# Patient Record
Sex: FEMALE | Race: WHITE
[De-identification: names, ages, dates, MRNs, and addresses within clinical notes are randomized per-mention and may not be internally consistent; named-entity substitution may affect disease eponyms.]

---

## 2017-12-13 ENCOUNTER — APPOINTMENT (OUTPATIENT)
Dept: OBGYN | Facility: CLINIC | Age: 57
End: 2017-12-13
Payer: COMMERCIAL

## 2017-12-13 VITALS
DIASTOLIC BLOOD PRESSURE: 79 MMHG | WEIGHT: 135 LBS | BODY MASS INDEX: 22.49 KG/M2 | HEART RATE: 74 BPM | SYSTOLIC BLOOD PRESSURE: 122 MMHG | HEIGHT: 65 IN

## 2017-12-13 PROCEDURE — 99396 PREV VISIT EST AGE 40-64: CPT

## 2017-12-14 LAB
C TRACH RRNA SPEC QL NAA+PROBE: NOT DETECTED
HPV HIGH+LOW RISK DNA PNL CVX: NOT DETECTED
N GONORRHOEA RRNA SPEC QL NAA+PROBE: NOT DETECTED
SOURCE TP AMPLIFICATION: NORMAL

## 2017-12-18 LAB — CYTOLOGY CVX/VAG DOC THIN PREP: NORMAL

## 2018-11-18 ENCOUNTER — EMERGENCY (EMERGENCY)
Facility: HOSPITAL | Age: 58
LOS: 0 days | Discharge: HOME | End: 2018-11-18
Attending: EMERGENCY MEDICINE | Admitting: EMERGENCY MEDICINE
Payer: COMMERCIAL

## 2018-11-18 VITALS
OXYGEN SATURATION: 97 % | DIASTOLIC BLOOD PRESSURE: 76 MMHG | HEART RATE: 80 BPM | TEMPERATURE: 98 F | RESPIRATION RATE: 18 BRPM | SYSTOLIC BLOOD PRESSURE: 107 MMHG

## 2018-11-18 VITALS — HEIGHT: 65 IN | WEIGHT: 134.04 LBS

## 2018-11-18 DIAGNOSIS — S01.411A LACERATION WITHOUT FOREIGN BODY OF RIGHT CHEEK AND TEMPOROMANDIBULAR AREA, INITIAL ENCOUNTER: ICD-10-CM
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
## 2018-11-18 PROCEDURE — 99283 EMERGENCY DEPT VISIT LOW MDM: CPT

## 2018-11-18 NOTE — ED ADULT NURSE NOTE - NSIMPLEMENTINTERV_GEN_ALL_ED
Implemented All Universal Safety Interventions:  [place] to call system. Call bell, personal items and telephone within reach. Instruct patient to call for assistance. Room bathroom lighting operational. Non-slip footwear when patient is off stretcher. Physically safe environment: no spills, clutter or unnecessary equipment. Stretcher in lowest position, wheels locked, appropriate side rails in place.

## 2018-11-18 NOTE — ED STATDOCS - SKIN, MLM
skin normal color for race, warm, dry and intact. Linear laceration approximately 2cm to right cheek. No active bleeding.

## 2018-11-18 NOTE — ED STATDOCS - ATTENDING CONTRIBUTION TO CARE
I, Ignacio Hinojosa MD,  performed the initial face to face bedside interview with this patient regarding history of present illness, review of symptoms and relevant past medical, social and family history.  I completed an independent physical examination.  I was the initial provider who evaluated this patient. I have signed out the follow up of any pending tests (i.e. labs, radiological studies) to the ACP.  I have communicated the patient’s plan of care and disposition with the ACP.

## 2018-11-18 NOTE — ED STATDOCS - OBJECTIVE STATEMENT
58 y/o female with no significant PMHx  presents to the ED c/o laceration. Pt was raking leaves, fell, and cut face on a branch. Pt here to see Dr. Veronica. Tetanus UTD. Allergies: Penicillin.

## 2018-11-18 NOTE — ED STATDOCS - CARE PROVIDER_API CALL
Henrik Hill), Plastic Surgery  [address] [facility]  [address] [place] [address]  Phone: [phone]  Fax: [phone]

## 2018-11-18 NOTE — ED STATDOCS - PROGRESS NOTE DETAILS
58 y/o F with no PMH presents with laceration to right cheek today. pt went to outside urgent care, had tetanus updated there. Here to see Dr. Veronica for repair. injury caused by tree branch. Not on anticoagulants. PE: 2cm vertical laceration to right cheek with minimal active bleeding. Surrounding tissue is not erythematous, warm to touch. No other signs of trauma. A/P: laceration. Dr. Veronica here for repair. - Nicola Mccabe PA-C

## 2018-11-18 NOTE — ED STATDOCS - NS_ ATTENDINGSCRIBEDETAILS _ED_A_ED_FT
I, Ignacio Hinojosa MD,  performed the initial face to face bedside interview with this patient regarding history of present illness, review of symptoms and relevant past medical, social and family history.  I completed an independent physical examination.    The history, relevant review of systems, past medical and surgical history, medical decision making, and physical examination was documented by the scribe in my presence and I attest to the accuracy of the documentation.

## 2018-11-23 ENCOUNTER — EMERGENCY (EMERGENCY)
Facility: HOSPITAL | Age: 58
LOS: 0 days | Discharge: HOME | End: 2018-11-23
Attending: EMERGENCY MEDICINE | Admitting: EMERGENCY MEDICINE

## 2018-11-23 VITALS
DIASTOLIC BLOOD PRESSURE: 94 MMHG | TEMPERATURE: 98 F | RESPIRATION RATE: 16 BRPM | HEART RATE: 89 BPM | OXYGEN SATURATION: 98 % | SYSTOLIC BLOOD PRESSURE: 144 MMHG

## 2018-11-23 VITALS — WEIGHT: 125 LBS | HEIGHT: 65 IN

## 2018-11-23 DIAGNOSIS — S01.81XD LACERATION WITHOUT FOREIGN BODY OF OTHER PART OF HEAD, SUBSEQUENT ENCOUNTER: ICD-10-CM
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
## 2018-11-23 NOTE — ED STATDOCS - PROGRESS NOTE DETAILS
CINTHYA Keys:   Patient has been seen, evaluated and orders have been written by the attending in intake. Patient is stable.  I will follow up the results of orders written and I will continue to evaluate/observe the patient.   Janell Keys PA-C Dr. perera finished suture removal.  Will dc home.  Janell Keys PA-C

## 2018-11-23 NOTE — ED STATDOCS - OBJECTIVE STATEMENT
58 y/o female with no PMHx presents to the ED requesting suture removal by Dr. Veronica. Pt states she was raking leaves on 11/18/18 when she fell and cut the right side of her face on a branch. Pt received sutures at that time which are ready to be removed. No pain or complaints at this time.
no chest pain, no cough, and no shortness of breath.

## 2018-11-23 NOTE — ED ADULT NURSE NOTE - NSIMPLEMENTINTERV_GEN_ALL_ED
Implemented All Universal Safety Interventions:  [place] to call system. Call bell, personal items and telephone within reach. Instruct patient to call for assistance. Room bathroom lighting operational. Non-slip footwear when patient is off stretcher. Physically safe environment: no spills, clutter or unnecessary equipment. Stretcher in lowest position, wheels locked, appropriate side rails in place.

## 2018-12-26 ENCOUNTER — APPOINTMENT (OUTPATIENT)
Dept: OBGYN | Facility: CLINIC | Age: 58
End: 2018-12-26

## 2019-01-25 ENCOUNTER — APPOINTMENT (OUTPATIENT)
Dept: DERMATOLOGY | Facility: CLINIC | Age: 59
End: 2019-01-25
Payer: COMMERCIAL

## 2019-01-25 PROCEDURE — 99202 OFFICE O/P NEW SF 15 MIN: CPT

## 2019-03-13 ENCOUNTER — APPOINTMENT (OUTPATIENT)
Dept: OBGYN | Facility: CLINIC | Age: 59
End: 2019-03-13
Payer: COMMERCIAL

## 2019-03-13 VITALS
BODY MASS INDEX: 23.66 KG/M2 | DIASTOLIC BLOOD PRESSURE: 87 MMHG | HEIGHT: 65 IN | WEIGHT: 142 LBS | SYSTOLIC BLOOD PRESSURE: 127 MMHG | HEART RATE: 68 BPM

## 2019-03-13 PROCEDURE — 99396 PREV VISIT EST AGE 40-64: CPT

## 2019-03-13 NOTE — HISTORY OF PRESENT ILLNESS
[Hot Flashes] : hot flashes [Night Sweats] : night sweats [Vaginal Itching] : no vaginal itching [Dyspareunia] : no dyspareunia [Mood Changes] : no mood changes [2/10] : described as 2/10 in severity [Headache] : no headache [Fatigue] : no fatigue [Weight Change] : no weight change [Irritability] : no irritability [Forgetfulness] : no forgetfulness [Loss of Libido] : no loss of libido [Depression] : no depression [Anxiety] : no anxiety [Hot Environment] : exacerbated by hot environment [Alcohol Intake] : not exacerbated by alcohol intake [Sexual Arousal] : not exacerbated by sexual arousal [Stress] : not exacerbated by stress [Cool Environment] : relieved by cool environment [Exercise] : not relieved by exercise [Relaxation] : relieved by relaxation [Hormonal Therapy] : not relieved by hormonal therapy [Sleep Medications] : not relieved by sleep medications [Anticholinergics] : not relieved by anticholinergics

## 2019-03-19 LAB — CYTOLOGY CVX/VAG DOC THIN PREP: NORMAL

## 2020-03-18 ENCOUNTER — APPOINTMENT (OUTPATIENT)
Dept: OBGYN | Facility: CLINIC | Age: 60
End: 2020-03-18

## 2020-10-06 ENCOUNTER — APPOINTMENT (OUTPATIENT)
Dept: OBGYN | Facility: CLINIC | Age: 60
End: 2020-10-06
Payer: COMMERCIAL

## 2020-10-06 VITALS
WEIGHT: 125.25 LBS | SYSTOLIC BLOOD PRESSURE: 134 MMHG | HEART RATE: 116 BPM | DIASTOLIC BLOOD PRESSURE: 89 MMHG | HEIGHT: 65 IN | BODY MASS INDEX: 20.87 KG/M2

## 2020-10-06 DIAGNOSIS — Z12.39 ENCOUNTER FOR OTHER SCREENING FOR MALIGNANT NEOPLASM OF BREAST: ICD-10-CM

## 2020-10-06 DIAGNOSIS — R23.2 FLUSHING: ICD-10-CM

## 2020-10-06 PROCEDURE — 99214 OFFICE O/P EST MOD 30 MIN: CPT | Mod: 25

## 2020-10-06 PROCEDURE — 99396 PREV VISIT EST AGE 40-64: CPT

## 2020-10-06 NOTE — PHYSICAL EXAM
[Appropriately responsive] : appropriately responsive [Alert] : alert [No Acute Distress] : no acute distress [No Lymphadenopathy] : no lymphadenopathy [Regular Rate Rhythm] : regular rate rhythm [No Murmurs] : no murmurs [Clear to Auscultation B/L] : clear to auscultation bilaterally [Soft] : soft [Non-tender] : non-tender [Non-distended] : non-distended [No HSM] : No HSM [No Lesions] : no lesions [No Mass] : no mass [Oriented x3] : oriented x3 [Examination Of The Breasts] : a normal appearance [No Masses] : no breast masses were palpable [Vulvar Atrophy] : vulvar atrophy [Labia Majora] : normal [Labia Minora] : normal [Atrophy] : atrophy [Cystocele] : a cystocele [Dry Mucosa] : dry mucosa [Rectocele] : a rectocele [Normal] : normal [Uterine Adnexae] : normal

## 2020-10-06 NOTE — REASON FOR VISIT
[Annual] : an annual visit. [Premenstrual Syndrome] : premenstrual syndrome [Urinary Complaints] : urinary complaints

## 2020-10-12 LAB — CYTOLOGY CVX/VAG DOC THIN PREP: NORMAL

## 2020-11-05 ENCOUNTER — EMERGENCY (EMERGENCY)
Facility: HOSPITAL | Age: 60
LOS: 1 days | Discharge: HOME | End: 2020-11-05
Attending: EMERGENCY MEDICINE
Payer: COMMERCIAL

## 2020-11-05 VITALS
OXYGEN SATURATION: 99 % | DIASTOLIC BLOOD PRESSURE: 71 MMHG | HEIGHT: 65 IN | HEART RATE: 103 BPM | SYSTOLIC BLOOD PRESSURE: 116 MMHG | TEMPERATURE: 98 F | RESPIRATION RATE: 20 BRPM | WEIGHT: 130.07 LBS

## 2020-11-05 LAB
ALBUMIN SERPL ELPH-MCNC: 4.6 G/DL — SIGNIFICANT CHANGE UP (ref 3.3–5.2)
ALP SERPL-CCNC: 66 U/L — SIGNIFICANT CHANGE UP (ref 40–120)
ALT FLD-CCNC: 95 U/L — HIGH
ANION GAP SERPL CALC-SCNC: 15 MMOL/L — SIGNIFICANT CHANGE UP (ref 5–17)
AST SERPL-CCNC: 97 U/L — HIGH
BASOPHILS # BLD AUTO: 0.02 K/UL — SIGNIFICANT CHANGE UP (ref 0–0.2)
BASOPHILS NFR BLD AUTO: 0.3 % — SIGNIFICANT CHANGE UP (ref 0–2)
BILIRUB SERPL-MCNC: 0.6 MG/DL — SIGNIFICANT CHANGE UP (ref 0.4–2)
BUN SERPL-MCNC: 7 MG/DL — LOW (ref 8–20)
CALCIUM SERPL-MCNC: 10 MG/DL — SIGNIFICANT CHANGE UP (ref 8.6–10.2)
CHLORIDE SERPL-SCNC: 101 MMOL/L — SIGNIFICANT CHANGE UP (ref 98–107)
CO2 SERPL-SCNC: 24 MMOL/L — SIGNIFICANT CHANGE UP (ref 22–29)
CREAT SERPL-MCNC: 0.53 MG/DL — SIGNIFICANT CHANGE UP (ref 0.5–1.3)
EOSINOPHIL # BLD AUTO: 0.14 K/UL — SIGNIFICANT CHANGE UP (ref 0–0.5)
EOSINOPHIL NFR BLD AUTO: 1.9 % — SIGNIFICANT CHANGE UP (ref 0–6)
GLUCOSE SERPL-MCNC: 100 MG/DL — HIGH (ref 70–99)
HCT VFR BLD CALC: 44.7 % — SIGNIFICANT CHANGE UP (ref 34.5–45)
HGB BLD-MCNC: 15.6 G/DL — HIGH (ref 11.5–15.5)
IMM GRANULOCYTES NFR BLD AUTO: 0.3 % — SIGNIFICANT CHANGE UP (ref 0–1.5)
LYMPHOCYTES # BLD AUTO: 1.02 K/UL — SIGNIFICANT CHANGE UP (ref 1–3.3)
LYMPHOCYTES # BLD AUTO: 13.8 % — SIGNIFICANT CHANGE UP (ref 13–44)
MCHC RBC-ENTMCNC: 34.9 GM/DL — SIGNIFICANT CHANGE UP (ref 32–36)
MCHC RBC-ENTMCNC: 36.2 PG — HIGH (ref 27–34)
MCV RBC AUTO: 103.7 FL — HIGH (ref 80–100)
MONOCYTES # BLD AUTO: 0.92 K/UL — HIGH (ref 0–0.9)
MONOCYTES NFR BLD AUTO: 12.4 % — SIGNIFICANT CHANGE UP (ref 2–14)
NEUTROPHILS # BLD AUTO: 5.29 K/UL — SIGNIFICANT CHANGE UP (ref 1.8–7.4)
NEUTROPHILS NFR BLD AUTO: 71.3 % — SIGNIFICANT CHANGE UP (ref 43–77)
PLATELET # BLD AUTO: 260 K/UL — SIGNIFICANT CHANGE UP (ref 150–400)
POTASSIUM SERPL-MCNC: 4.6 MMOL/L — SIGNIFICANT CHANGE UP (ref 3.5–5.3)
POTASSIUM SERPL-SCNC: 4.6 MMOL/L — SIGNIFICANT CHANGE UP (ref 3.5–5.3)
PROT SERPL-MCNC: 8.1 G/DL — SIGNIFICANT CHANGE UP (ref 6.6–8.7)
RBC # BLD: 4.31 M/UL — SIGNIFICANT CHANGE UP (ref 3.8–5.2)
RBC # FLD: 11.9 % — SIGNIFICANT CHANGE UP (ref 10.3–14.5)
SODIUM SERPL-SCNC: 139 MMOL/L — SIGNIFICANT CHANGE UP (ref 135–145)
WBC # BLD: 7.41 K/UL — SIGNIFICANT CHANGE UP (ref 3.8–10.5)
WBC # FLD AUTO: 7.41 K/UL — SIGNIFICANT CHANGE UP (ref 3.8–10.5)

## 2020-11-05 PROCEDURE — 80053 COMPREHEN METABOLIC PANEL: CPT

## 2020-11-05 PROCEDURE — 96374 THER/PROPH/DIAG INJ IV PUSH: CPT

## 2020-11-05 PROCEDURE — 87040 BLOOD CULTURE FOR BACTERIA: CPT

## 2020-11-05 PROCEDURE — 36415 COLL VENOUS BLD VENIPUNCTURE: CPT

## 2020-11-05 PROCEDURE — 99284 EMERGENCY DEPT VISIT MOD MDM: CPT

## 2020-11-05 PROCEDURE — 96375 TX/PRO/DX INJ NEW DRUG ADDON: CPT

## 2020-11-05 PROCEDURE — 85025 COMPLETE CBC W/AUTO DIFF WBC: CPT

## 2020-11-05 PROCEDURE — 99284 EMERGENCY DEPT VISIT MOD MDM: CPT | Mod: 25

## 2020-11-05 RX ADMIN — Medication 125 MILLIGRAM(S): at 12:33

## 2020-11-05 RX ADMIN — Medication 100 MILLIGRAM(S): at 12:34

## 2020-11-05 NOTE — ED ADULT TRIAGE NOTE - NS ED NURSE DIRECT TO ROOM YN
Assessment:  1  Aftercare following surgery for injury or trauma     2  Right knee pain, unspecified chronicity     3  Patellofemoral disorder of right knee         Plan: Will continue to monitor Shaunice every 3 months  She continues to note lateral knee sensitivity with cracking under patella  She continues with decreased sensation lateral knee and calf down to ankle  Transient weakness in right ankle  Continue with tylenol as needed for pain control  To do next visit:  Return in about 3 months (around 3/21/2019) for Recheck  The above stated was discussed in layman's terms and the patient expressed understanding  All questions were answered to the patient's satisfaction  Scribe Attestation    I,:   Rose Dominguez am acting as a scribe while in the presence of the attending physician :        I,:   Sharad Flores MD personally performed the services described in this documentation    as scribed in my presence :              Subjective:   Natalie Yoder is a 42 y o  female who presents one yr s/p R ORIF of tibia 1/2018 and then 4 5 months s/p removal of hardware 8/2/18  She continues to note tenderness lateral knee around the incision with decreased sensation lateral knee and down lateral portion of calf  Her foot will go numb with driving, she uses cruise control and constantly moves the foot which does help with numbness  She has good flexion, extension  She does not cracking in knee when transitioning from extension to flexion  She is using tylenol, Oxycodone as needed  Numbness in leg unchanged from last appt         Review of systems negative unless otherwise specified in HPI    Past Medical History:   Diagnosis Date    Anemia     Edema     Fatigue     Vitamin B12 deficiency     Vitamin D deficiency        Past Surgical History:   Procedure Laterality Date    KNEE ARTHROSCOPY Bilateral     KNEE SURGERY      ORIF TIBIA FRACTURE Right 1/18/2018    Procedure: OPEN REDUCTION W/ INTERNAL FIXATION (ORIF) TIBIA;  Surgeon: Shabbir Wilson MD;  [facility];  Service: Orthopedics    ORIF TIBIAL PLATEAU Right 2/15/1865    Procedure: OPEN REDUCTION W/ INTERNAL FIXATION (ORIF) TIBIAL PLATEAU;  Surgeon: Shabbir Wilson MD;  [facility];  Service: Orthopedics    DE REMOVAL DEEP IMPLANT Right 8/2/2018    Procedure: REMOVAL HARDWARE TIBIA;  Surgeon: Shabbir Wilson MD;  [facility];  Service: Orthopedics    TUBAL LIGATION         Family History   Problem Relation Age of Onset    Cancer Mother     Heart disease Mother         heart surgery    Cancer Father     Heart attack Father         stent    Diabetes Father     Hypertension Father     Arthritis Family     Stomach cancer Family     Ovarian cancer Family     Alcohol abuse Neg Hx     Depression Neg Hx     Drug abuse Neg Hx     Substance Abuse Neg Hx        Social History     Occupational History    Teacher in [place]      Social History Main Topics    Smoking status: Never Smoker    Smokeless tobacco: Never Used    Alcohol use No      Comment: social drink sporatic    Drug use: No    Sexual activity: Yes         Current Outpatient Prescriptions:     acetaminophen (TYLENOL) 325 mg tablet, 650 mg every 6 hours for mild pain, Disp: 30 tablet, Rfl: 0    cyanocobalamin 1,000 mcg/mL, Inject 1 mL (1,000 mcg total) into the shoulder, thigh, or buttocks every 30 (thirty) days, Disp: 10 mL, Rfl: 0    ergocalciferol (VITAMIN D2) 50,000 units, Take 1 capsule (50,000 Units total) by mouth once a week, Disp: 12 capsule, Rfl: 0    naproxen (NAPROSYN) 375 mg tablet, Take 250 mg by mouth 2 (two) times a day with meals, Disp: , Rfl:     oxyCODONE (ROXICODONE) 5 mg immediate release tablet, Take 1 tablet (5 mg total) by mouth every 6 (six) hours as needed for severe pain Max Daily Amount: 20 mg, Disp: 60 tablet, Rfl: 0    SYRINGE-NEEDLE, DISP, 3 ML 25G X 1" 3 ML MISC, by Does not apply route every 30 (thirty) days, Disp: 50 each, Rfl: 0    No Known Allergies         Vitals:    12/21/18 1511   BP: 111/78   Pulse: 90   Resp: 18       Objective:                    Right Knee Exam     Tenderness   The patient is experiencing tenderness in the lateral joint line and medial joint line  Range of Motion   Right knee extension: -2    Flexion: 120     Tests   Baldemar:  Medial - negative Lateral - negative  Lachman:  Anterior - negative    Posterior - negative  Drawer:       Anterior - negative    Posterior - negative  Varus: negative  Valgus: negative    Other   Erythema: absent  Scars: present (well healed scar)  Sensation: decreased  Pulse: present  Swelling: none    Comments:  Transient weakness in Right ankle  Appropriate ROM right ankle    No calf pain  Decreased sensation lateral distrubution lateral knee and lateral calf  Diagnostics, reviewed and taken today if performed as documented:    None performed          Procedures, if performed today:    Procedures    None performed      Portions of the record may have been created with voice recognition software   Occasional wrong word or "sound a like" substitutions may have occurred due to the inherent limitations of voice recognition software   Read the chart carefully and recognize, using context, where substitutions have occurred  No

## 2020-11-05 NOTE — ED ADULT TRIAGE NOTE - CHIEF COMPLAINT QUOTE
I was bit by an insect yesterday and went to Urgent Care and sent to ED for IV abx, presents with redness swelling and warmth to right lower arm

## 2020-11-05 NOTE — ED PROVIDER NOTE - PATIENT PORTAL LINK FT
You can access the FollowMyHealth Patient Portal offered by [facility] by registering at the following website: [email or web address]. By joining [employer]’s FollowMyHealth portal, you will also be able to view your health information using other applications (apps) compatible with our system.

## 2020-11-10 LAB
CULTURE RESULTS: SIGNIFICANT CHANGE UP
CULTURE RESULTS: SIGNIFICANT CHANGE UP
SPECIMEN SOURCE: SIGNIFICANT CHANGE UP
SPECIMEN SOURCE: SIGNIFICANT CHANGE UP

## 2021-01-29 ENCOUNTER — NON-APPOINTMENT (OUTPATIENT)
Age: 61
End: 2021-01-29

## 2021-01-29 ENCOUNTER — APPOINTMENT (OUTPATIENT)
Dept: FAMILY MEDICINE | Facility: CLINIC | Age: 61
End: 2021-01-29
Payer: COMMERCIAL

## 2021-01-29 VITALS
BODY MASS INDEX: 21.33 KG/M2 | OXYGEN SATURATION: 96 % | HEART RATE: 87 BPM | RESPIRATION RATE: 18 BRPM | HEIGHT: 65 IN | WEIGHT: 128 LBS | SYSTOLIC BLOOD PRESSURE: 130 MMHG | DIASTOLIC BLOOD PRESSURE: 90 MMHG | TEMPERATURE: 98.3 F

## 2021-01-29 DIAGNOSIS — D64.9 ANEMIA, UNSPECIFIED: ICD-10-CM

## 2021-01-29 DIAGNOSIS — Z00.00 ENCOUNTER FOR GENERAL ADULT MEDICAL EXAMINATION W/OUT ABNORMAL FINDINGS: ICD-10-CM

## 2021-01-29 DIAGNOSIS — Z80.41 FAMILY HISTORY OF MALIGNANT NEOPLASM OF OVARY: ICD-10-CM

## 2021-01-29 DIAGNOSIS — Z12.11 ENCOUNTER FOR SCREENING FOR MALIGNANT NEOPLASM OF COLON: ICD-10-CM

## 2021-01-29 DIAGNOSIS — Z80.49 FAMILY HISTORY OF MALIGNANT NEOPLASM OF OTHER GENITAL ORGANS: ICD-10-CM

## 2021-01-29 PROCEDURE — 36415 COLL VENOUS BLD VENIPUNCTURE: CPT

## 2021-01-29 PROCEDURE — 99386 PREV VISIT NEW AGE 40-64: CPT | Mod: 25

## 2021-01-29 PROCEDURE — 99072 ADDL SUPL MATRL&STAF TM PHE: CPT

## 2021-02-03 ENCOUNTER — NON-APPOINTMENT (OUTPATIENT)
Age: 61
End: 2021-02-03

## 2021-03-10 ENCOUNTER — LABORATORY RESULT (OUTPATIENT)
Age: 61
End: 2021-03-10

## 2021-03-10 NOTE — HEALTH RISK ASSESSMENT
[FreeTextEntry1] : depressed [] : No [de-identified] : denies [de-identified] : GYN [Audit-CScore] : 3 [de-identified] : Walking [de-identified] : cereal, sandwiches [[id number]] : 3 [LowDoseCTScan] : n/a [EyeExamDate] : 10/20 [Change in mental status noted] : No change in mental status noted [Language] : denies difficulty with language [Behavior] : denies difficulty with behavior [Learning/Retaining New Information] : denies difficulty learning/retaining new information [Handling Complex Tasks] : denies difficulty handling complex tasks [Reasoning] : denies difficulty with reasoning [Spatial Ability and Orientation] : denies difficulty with spatial ability and orientation [Reports changes in hearing] : Reports no changes in hearing [Reports changes in vision] : Reports no changes in vision [Reports changes in dental health] : Reports no changes in dental health [Safety elements used in home] : no safety elements used in home [Seat Belt] : does not use seat belt [Sunscreen] : does not use sunscreen [Travel to Developing Areas] : does not  travel to developing areas [TB Exposure] : is not being exposed to tuberculosis [Caregiver Concerns] : does not have caregiver concerns [MammogramDate] : 10/20 [PapSmearDate] : 10/20 [BoneDensityDate] : denies [ColonoscopyDate] : denies [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

## 2021-03-10 NOTE — HISTORY OF PRESENT ILLNESS
[FreeTextEntry1] : Pt is here to establish PCP. [de-identified] : 59 y/o F with no significant past medical hx presents today to establish PCP and CPE.\par Pt works Banker remote , . Mother of 26 y/o son who's moving to [place].\par She reports to feel sad, down.\par She has a hx Cystocele and rectocele. Never f/up with specialist as recommended by Gyn.

## 2021-03-10 NOTE — ASSESSMENT
[FreeTextEntry1] : HCM:\par -Blood and UA today\par -HIV/HC screening\par \par Mammo/ Gyn: up to date \par \par Colon Cancer screening: FOBT kit with instructions\par \par Immunizations: refused\par \par Depression screenin\par -Counseling in meds risks and benefits\par -Holistic supplements d/c pt\par -F/up in 4 weeks\par \par Cystocele:\par -Urogyn referral\par \par Further recommendations with lab results.

## 2021-03-11 LAB
APPEARANCE: ABNORMAL
BASOPHILS # BLD AUTO: 0.09 K/UL
BASOPHILS NFR BLD AUTO: 2.5 %
BILIRUBIN URINE: NEGATIVE
BLOOD URINE: NEGATIVE
COLOR: YELLOW
EOSINOPHIL # BLD AUTO: 0.19 K/UL
EOSINOPHIL NFR BLD AUTO: 5.2 %
ESTIMATED AVERAGE GLUCOSE: 100 MG/DL
GLUCOSE QUALITATIVE U: NEGATIVE
HBA1C MFR BLD HPLC: 5.1 %
HCT VFR BLD CALC: 42.7 %
HCV AB SER QL: NONREACTIVE
HCV S/CO RATIO: 0.07 S/CO
HGB BLD-MCNC: 14.8 G/DL
HIV1+2 AB SPEC QL IA.RAPID: NONREACTIVE
IMM GRANULOCYTES NFR BLD AUTO: 0 %
KETONES URINE: NORMAL
LEUKOCYTE ESTERASE URINE: NEGATIVE
LYMPHOCYTES # BLD AUTO: 1.08 K/UL
LYMPHOCYTES NFR BLD AUTO: 29.6 %
MAN DIFF?: NORMAL
MCHC RBC-ENTMCNC: 34.7 GM/DL
MCHC RBC-ENTMCNC: 36.9 PG
MCV RBC AUTO: 106.5 FL
MONOCYTES # BLD AUTO: 0.57 K/UL
MONOCYTES NFR BLD AUTO: 15.6 %
NEUTROPHILS # BLD AUTO: 1.72 K/UL
NEUTROPHILS NFR BLD AUTO: 47.1 %
NITRITE URINE: NEGATIVE
PH URINE: 6
PLATELET # BLD AUTO: 285 K/UL
PROTEIN URINE: ABNORMAL
RBC # BLD: 4.01 M/UL
RBC # FLD: 13.2 %
SPECIFIC GRAVITY URINE: 1.03
TSH SERPL-ACNC: 2.57 UIU/ML
UROBILINOGEN URINE: NORMAL
VIT B12 SERPL-MCNC: 405 PG/ML
WBC # FLD AUTO: 3.65 K/UL

## 2021-03-15 LAB
25(OH)D3 SERPL-MCNC: 17.4 NG/ML
ALBUMIN SERPL ELPH-MCNC: 4.9 G/DL
ALP BLD-CCNC: 69 U/L
ALT SERPL-CCNC: 123 U/L
ANION GAP SERPL CALC-SCNC: 12 MMOL/L
AST SERPL-CCNC: 164 U/L
BILIRUB SERPL-MCNC: 0.6 MG/DL
BUN SERPL-MCNC: 7 MG/DL
CALCIUM SERPL-MCNC: 10 MG/DL
CHLORIDE SERPL-SCNC: 101 MMOL/L
CHOLEST SERPL-MCNC: 243 MG/DL
CO2 SERPL-SCNC: 24 MMOL/L
CREAT SERPL-MCNC: 0.7 MG/DL
GLUCOSE SERPL-MCNC: 91 MG/DL
HDLC SERPL-MCNC: 100 MG/DL
LDLC SERPL CALC-MCNC: 124 MG/DL
NONHDLC SERPL-MCNC: 143 MG/DL
POTASSIUM SERPL-SCNC: 4.6 MMOL/L
PROT SERPL-MCNC: 7.4 G/DL
SODIUM SERPL-SCNC: 138 MMOL/L
TRIGL SERPL-MCNC: 94 MG/DL

## 2021-03-17 DIAGNOSIS — Z63.4 DISAPPEARANCE AND DEATH OF FAMILY MEMBER: ICD-10-CM

## 2021-03-17 DIAGNOSIS — Z78.9 OTHER SPECIFIED HEALTH STATUS: ICD-10-CM

## 2021-03-17 SDOH — SOCIAL STABILITY - SOCIAL INSECURITY: DISSAPEARANCE AND DEATH OF FAMILY MEMBER: Z63.4

## 2021-03-18 LAB
VIT A SERPL-MCNC: 43.3 UG/DL
VIT B6 SERPL-MCNC: 8.7 UG/L
VIT C SERPL-MCNC: 0.2 MG/DL

## 2021-03-24 ENCOUNTER — APPOINTMENT (OUTPATIENT)
Dept: UROGYNECOLOGY | Facility: CLINIC | Age: 61
End: 2021-03-24
Payer: COMMERCIAL

## 2021-03-24 ENCOUNTER — RESULT CHARGE (OUTPATIENT)
Age: 61
End: 2021-03-24

## 2021-03-24 VITALS
HEIGHT: 64 IN | WEIGHT: 125 LBS | DIASTOLIC BLOOD PRESSURE: 80 MMHG | BODY MASS INDEX: 21.34 KG/M2 | SYSTOLIC BLOOD PRESSURE: 131 MMHG

## 2021-03-24 DIAGNOSIS — N39.41 URGE INCONTINENCE: ICD-10-CM

## 2021-03-24 DIAGNOSIS — N39.3 STRESS INCONTINENCE (FEMALE) (MALE): ICD-10-CM

## 2021-03-24 DIAGNOSIS — Z60.2 PROBLEMS RELATED TO LIVING ALONE: ICD-10-CM

## 2021-03-24 DIAGNOSIS — N81.11 CYSTOCELE, MIDLINE: ICD-10-CM

## 2021-03-24 DIAGNOSIS — Z86.59 PERSONAL HISTORY OF OTHER MENTAL AND BEHAVIORAL DISORDERS: ICD-10-CM

## 2021-03-24 LAB
BILIRUB UR QL STRIP: NEGATIVE
CLARITY UR: CLEAR
COLLECTION METHOD: NORMAL
GLUCOSE UR-MCNC: NEGATIVE
HCG UR QL: 0.2 EU/DL
HGB UR QL STRIP.AUTO: NEGATIVE
KETONES UR-MCNC: NEGATIVE
LEUKOCYTE ESTERASE UR QL STRIP: NEGATIVE
NITRITE UR QL STRIP: NEGATIVE
PH UR STRIP: 6
PROT UR STRIP-MCNC: NEGATIVE
SP GR UR STRIP: 1

## 2021-03-24 PROCEDURE — 51701 INSERT BLADDER CATHETER: CPT

## 2021-03-24 PROCEDURE — 99072 ADDL SUPL MATRL&STAF TM PHE: CPT

## 2021-03-24 PROCEDURE — 99204 OFFICE O/P NEW MOD 45 MIN: CPT | Mod: 25

## 2021-03-24 SDOH — SOCIAL STABILITY - SOCIAL INSECURITY: PROBLEMS RELATED TO LIVING ALONE: Z60.2

## 2021-03-24 NOTE — ASSESSMENT
[FreeTextEntry1] : Louise is a pleasant 61 yo P1, , presents with symptopmatic OAB-wet x 1 year. On exam, her empty supine CST was neg, however she had positive urethral hypermobility. Her straight-cath PVR volume was normal and the dip was neg. On pelvic exam, she had a positive bulbo reflex. There were no appreciable masses, cysts, or lesions. Pelvic floor muscle contraction strength was present but weak. There was no levator or pelvic floor musculature banding, tightness, or tenderness. POPQ exam did not demonstrate clinically significant pelvic organ prolapse. She had mild anterior and posterior POP but I don't feel it is signif.\par \par The patient has urinary symptoms consistent with overactive bladder. The etiology of OAB was discussed. Management options including observation, behavioral modifications (dietary changes, monitoring fluid intake, bladder training, timed voids, use of pads/protective garments), kegels, PT, medications, PTNS, SNS, and bladder Botox were all reviewed. She is interested in PTNS and will return for such. Repeat PVR as it was 90 ml today likely due to 15 min wait and hydration in anticpation to office, as the cystocele was not signif on exam. All ques answered.\par \par Plan:\par [] PTNS x 12 weekly sessions\par [] repeat PVR

## 2021-03-24 NOTE — PROCEDURE
[Straight Catheterization] : insertion of a straight catheter [Urinary Frequency] : urinary frequency [Patient] : the patient [___ Fr Straight Tip] : a [unfilled] in [place] straight tip catheter [None] : there were no complications with the catheter insertion [Clear] : clear [No Complications] : no complications [Tolerated Well] : the patient tolerated the procedure well [Post procedure instructions and information given] : Post procedure instructions and information were given and reviewed with patient. [2] : 2 [FreeTextEntry1] : cathed to obtain pvr and uncontam specimen

## 2021-03-24 NOTE — PHYSICAL EXAM
[Chaperone Present] : A chaperone was present in the examining room during all aspects of the physical examination [No Acute Distress] : in no acute distress [Oriented x3] : oriented to person, place, and time [No Edema] : ~T edema was not present [Symmetrical] : the neck was ~L symmetrical [Soft, Nontender] : the abdomen was soft and nontender [None] : no CVA tenderness [Warm and Dry] : was warm and dry to touch [Normal Gait] : gait was normal [Labia Majora] : were normal [Labia Minora] : were normal [Bartholin's Gland] : both Bartholin's glands were normal  [Normal Appearance] : general appearance was normal [No Bleeding] : there was no active vaginal bleeding [2] : 2 [Aa ____] : Aa [unfilled] [Ba ____] : Ba [unfilled] [C ____] : C [unfilled] [GH ____] : GH [unfilled] [PB ____] : PB [unfilled] [TVL ____] : TVL  [unfilled] [Ap ____] : Ap [unfilled] [Bp ____] : Bp [unfilled] [D ____] : D [unfilled] [] : I [Normal] : normal [Soft] :  the cervix was soft [Post Void Residual ____ml] : post void residual was [unfilled] ml [Exam Deferred] : was deferred [Cough] : no cough [Tenderness] : ~T no ~M abdominal tenderness observed [Distended] : not distended [Inguinal LAD] : no adenopathy was noted in the inguinal lymph nodes [FreeTextEntry3] : empty supine cst neg, +urethral hyperm [FreeTextEntry4] : no mass cyst or lesion [de-identified] : nontender, no appreciable mass

## 2021-03-24 NOTE — HISTORY OF PRESENT ILLNESS
[FreeTextEntry1] : 1 year of leakage of urine with urgency, urinary urgency freq and nocturia 1. 4 moist liner changes per 24 hours. Since working remotly, symptoms worsened - going more freq and urgently. No dysuria or UTI, no gross hematuria, no incomplete emptying, no flank pain. No vag bleeding since menopause, not SA at present, no bulge or pressure in vagina. No intervention as of yet. Bothered and affecting QOL. Normal bowel movements without constipation or trapping. 24 hour VD: 14v4 pad changes; intake about 18 oz coffee, 50 oz regular water, 4 oz wine\par \par BMI 21\par Never a tobacco smoker\par

## 2021-03-24 NOTE — OB HISTORY
[Total Preg ___] : : [unfilled] [Full Term ___] : [unfilled] (full-term) [Vaginal ___] : [unfilled] vaginal delivery(s) [Definite ___ (Date)] : the last menstrual period was [unfilled] [Last Pap Smear ___] : date of last pap smear was on [unfilled] [Abnormal Pap Smear] : normal pap smear [Taking Estrogens] : is not taking estrogen replacement [Abnormal Bleeding] : without bleeding [Sexually Active] : is not sexually active [FreeTextEntry1] : largest baby 9lbs 3 oz

## 2021-03-25 LAB
HBV SURFACE AB SER QL: NONREACTIVE
HCV AB SER QL: NONREACTIVE
HCV S/CO RATIO: 0.07 S/CO

## 2021-03-26 LAB
ALBUMIN SERPL ELPH-MCNC: 4.4 G/DL
ALP BLD-CCNC: 55 U/L
ALT SERPL-CCNC: 91 U/L
AST SERPL-CCNC: 91 U/L
BILIRUB DIRECT SERPL-MCNC: 0.2 MG/DL
BILIRUB INDIRECT SERPL-MCNC: 0.3 MG/DL
BILIRUB SERPL-MCNC: 0.5 MG/DL
PROT SERPL-MCNC: 7 G/DL

## 2021-03-30 ENCOUNTER — OUTPATIENT (OUTPATIENT)
Dept: OUTPATIENT SERVICES | Facility: HOSPITAL | Age: 61
LOS: 1 days | End: 2021-03-30
Payer: COMMERCIAL

## 2021-03-30 ENCOUNTER — APPOINTMENT (OUTPATIENT)
Dept: ULTRASOUND IMAGING | Facility: CLINIC | Age: 61
End: 2021-03-30
Payer: COMMERCIAL

## 2021-03-30 DIAGNOSIS — R94.5 ABNORMAL RESULTS OF LIVER FUNCTION STUDIES: ICD-10-CM

## 2021-03-30 PROCEDURE — 76700 US EXAM ABDOM COMPLETE: CPT | Mod: 26

## 2021-03-30 PROCEDURE — 76700 US EXAM ABDOM COMPLETE: CPT

## 2021-04-07 ENCOUNTER — APPOINTMENT (OUTPATIENT)
Dept: UROGYNECOLOGY | Facility: CLINIC | Age: 61
End: 2021-04-07

## 2021-04-12 ENCOUNTER — NON-APPOINTMENT (OUTPATIENT)
Age: 61
End: 2021-04-12

## 2021-04-14 ENCOUNTER — APPOINTMENT (OUTPATIENT)
Dept: UROGYNECOLOGY | Facility: CLINIC | Age: 61
End: 2021-04-14

## 2021-04-21 ENCOUNTER — APPOINTMENT (OUTPATIENT)
Dept: UROGYNECOLOGY | Facility: CLINIC | Age: 61
End: 2021-04-21

## 2021-04-28 ENCOUNTER — APPOINTMENT (OUTPATIENT)
Dept: UROGYNECOLOGY | Facility: CLINIC | Age: 61
End: 2021-04-28

## 2021-05-05 ENCOUNTER — APPOINTMENT (OUTPATIENT)
Dept: UROGYNECOLOGY | Facility: CLINIC | Age: 61
End: 2021-05-05

## 2021-05-12 ENCOUNTER — APPOINTMENT (OUTPATIENT)
Dept: UROGYNECOLOGY | Facility: CLINIC | Age: 61
End: 2021-05-12

## 2021-08-29 ENCOUNTER — EMERGENCY (EMERGENCY)
Facility: HOSPITAL | Age: 61
LOS: 1 days | Discharge: HOME | End: 2021-08-29
Attending: EMERGENCY MEDICINE
Payer: COMMERCIAL

## 2021-08-29 VITALS
HEIGHT: 65 IN | SYSTOLIC BLOOD PRESSURE: 152 MMHG | HEART RATE: 85 BPM | WEIGHT: 169.98 LBS | DIASTOLIC BLOOD PRESSURE: 80 MMHG | RESPIRATION RATE: 16 BRPM | OXYGEN SATURATION: 100 % | TEMPERATURE: 98 F

## 2021-08-29 PROCEDURE — 93005 ELECTROCARDIOGRAM TRACING: CPT

## 2021-08-29 PROCEDURE — 99284 EMERGENCY DEPT VISIT MOD MDM: CPT

## 2021-08-29 PROCEDURE — 99284 EMERGENCY DEPT VISIT MOD MDM: CPT | Mod: 25

## 2021-08-29 PROCEDURE — 71045 X-RAY EXAM CHEST 1 VIEW: CPT

## 2021-08-29 PROCEDURE — 71045 X-RAY EXAM CHEST 1 VIEW: CPT | Mod: 26

## 2021-08-29 PROCEDURE — 93010 ELECTROCARDIOGRAM REPORT: CPT

## 2021-08-29 RX ORDER — ASPIRIN/CALCIUM CARB/MAGNESIUM 324 MG
162 TABLET ORAL ONCE
Refills: 0 | Status: COMPLETED | OUTPATIENT
Start: 2021-08-29 | End: 2021-08-29

## 2021-08-29 NOTE — ED ADULT NURSE REASSESSMENT NOTE - NS ED NURSE REASSESS COMMENT FT1
Pt refusing blood work , " I'm ready to go, I don't want any blood work, I just want my test results". Dr. Dennison aware. to speak with pt.

## 2021-08-29 NOTE — ED PROVIDER NOTE - PATIENT PORTAL LINK FT
You can access the FollowMyHealth Patient Portal offered by [facility] by registering at the following website: [email or web address]. By joining [employer]’s FollowMyHealth portal, you will also be able to view your health information using other applications (apps) compatible with our system.

## 2021-08-29 NOTE — ED ADULT NURSE NOTE - CHIEF COMPLAINT QUOTE
pt c/o midsternal chest pain and hiccups since yesterday hiccups have subsided but pain is still there.  patient denies shortness of breath.

## 2021-08-29 NOTE — ED PROVIDER NOTE - PHYSICAL EXAMINATION
Alert, lucid, and in no apparent distress. Pt is normocephalic, atraumatic.  Pupils are equal, round, no dysmetria. lips pink, moist mucous membranes, tongue midline. Neck supple.   Lungs clear to auscultation. Heart regular rate and rhythm, normal S1, S2, no murmurs, gallops, rubs.  Abdomen is soft, nontender, no pulsatile mass, no masses, no distension, no rebound. No CVA Tenderness, no suprapubic tenderness.   Non-focal sensory, 5 out of 5 motor strength, no dysmetria, fluent, goal directed speech.   No submandibular adenopathy. Normal mentation, does not appear agitated

## 2021-08-29 NOTE — ED PROVIDER NOTE - OBJECTIVE STATEMENT
61 yo female no pmh comes to ed with left sided chest pain  ; pt denies any radiation, nausea of vomiting; pt noted pain has resolved

## 2021-08-29 NOTE — ED PROVIDER NOTE - CLINICAL SUMMARY MEDICAL DECISION MAKING FREE TEXT BOX
61 yo female with chest pain eval acs, pe, pna;   pt refusing blood work and requesting to be discharged ; pt signed ama  discussed risks and benefits; pt to followup with pmd and cardiology; ama form in chart

## 2021-08-29 NOTE — ED PROVIDER NOTE - NSTIMEPROVIDERCAREINITIATE_GEN_ER
[de-identified] : Patient returns after being seen in 2014.  She has since had some pain in her lower back.  She did have some epidural injections last year without relief of her pain.  She is interested to try the epidural injecitons with me as she has experienced relief in the past. [Back] : back [___ yrs] : [unfilled] year(s) ago [8] : a current pain level of 8/10 [Sharp] : sharp [Bilateral] : bilateral [Posterior] : posterior aspect of the [Calf] : calf [Tingling] : tingling [Standing] : standing [Walking] : walking [Sitting] : sitting [PT] : PT [Injections] : injections [FreeTextEntry2] : both legs [FreeTextEntry6] : had epidural injections last year (last one in December 2018) 29-Aug-2021 18:15

## 2021-12-15 ENCOUNTER — APPOINTMENT (OUTPATIENT)
Dept: FAMILY MEDICINE | Facility: CLINIC | Age: 61
End: 2021-12-15
Payer: COMMERCIAL

## 2021-12-15 VITALS
DIASTOLIC BLOOD PRESSURE: 80 MMHG | TEMPERATURE: 97.9 F | HEART RATE: 86 BPM | OXYGEN SATURATION: 98 % | WEIGHT: 124 LBS | HEIGHT: 64 IN | RESPIRATION RATE: 16 BRPM | BODY MASS INDEX: 21.17 KG/M2 | SYSTOLIC BLOOD PRESSURE: 132 MMHG

## 2021-12-15 PROCEDURE — 99214 OFFICE O/P EST MOD 30 MIN: CPT | Mod: 25

## 2021-12-15 PROCEDURE — 36415 COLL VENOUS BLD VENIPUNCTURE: CPT

## 2021-12-15 NOTE — ASSESSMENT
[FreeTextEntry1] : HCM:\par -2021\par -HIV/HC screening: negative\par \par Mammo/ Gyn: up to date \par \par Colon Cancer screening: FOBT kit with instructions\par \par Immunizations: Had 2 doses COVID vaccine\par \par Depression screenin\par -Counseling in meds risks and benefits\par -Holistic supplements d/c pt\par -Start sertraline 25mg\par \par Abnormal LFT's/Fatty liver:\par -USG 2021\par -repeat LFT today\par \par Cystocele:\par -Urogyn referral\par \par Further recommendations with lab results.

## 2021-12-15 NOTE — HISTORY OF PRESENT ILLNESS
[FreeTextEntry1] : F/up in LFT\par Sore throat [de-identified] : Pt reports Tickle on throat and cough dry for aprox 3 weeks. Just return from [facility] 12/05/21. She had a Covid tests negative.\par Had COVID vaccine PFizer.\par Abnormal LFTs's/ Fatty liver.\par She reports anxiety and feeling down.

## 2021-12-15 NOTE — HEALTH RISK ASSESSMENT
[Never] : Never [Yes] : Yes [4 or more  times a week (4 pts)] : 4 or more  times a week (4 points) [1 or 2 (0 pts)] : 1 or 2 (0 points) [Never (0 pts)] : Never (0 points) [No] : In the past 12 months have you used drugs other than those required for medical reasons? No [No falls in past year] : Patient reported no falls in the past year

## 2021-12-16 LAB
ALBUMIN SERPL ELPH-MCNC: 5 G/DL
ALP BLD-CCNC: 60 U/L
ALT SERPL-CCNC: 21 U/L
ANION GAP SERPL CALC-SCNC: 15 MMOL/L
AST SERPL-CCNC: 31 U/L
BILIRUB SERPL-MCNC: 0.7 MG/DL
BUN SERPL-MCNC: 9 MG/DL
CALCIUM SERPL-MCNC: 10.1 MG/DL
CHLORIDE SERPL-SCNC: 97 MMOL/L
CO2 SERPL-SCNC: 24 MMOL/L
CREAT SERPL-MCNC: 0.63 MG/DL
GLUCOSE SERPL-MCNC: 73 MG/DL
POTASSIUM SERPL-SCNC: 4.4 MMOL/L
PROT SERPL-MCNC: 7.7 G/DL
SODIUM SERPL-SCNC: 136 MMOL/L

## 2022-01-03 NOTE — ED PROVIDER NOTE - WR ORDER NAME 1
Patient chose to have [address] screen  Instructed patient on process for checking her OOP cost via [employer] [facility]  Provided [id number] instruction card toll free # [phone]  Patient made aware if [id number]  unable to give an estimate she will need to contact M office prior to blood draw  Patient aware that  is provided by third party and is only an estimate of cost not a guarantee  Insurance may require prior authorization, if test drawn without prior authorization she will be responsible for full cost of test   For definitive OOP cost, lab deductible or if lab authorization is required patient encouraged to call her insurance provider  Explained customer service insurance phone # located on the back of her ID card  [facility] will have results in approximately 7-10 business days and will call patient or notify via [address]  Patient aware viewing lab result online will reveal gender  Patient will be going to [facility] for collection  Patient verbalized understanding of all instructions and no questions at this time  Xray Chest 1 View-PORTABLE IMMEDIATE

## 2022-01-19 ENCOUNTER — APPOINTMENT (OUTPATIENT)
Dept: OBGYN | Facility: CLINIC | Age: 62
End: 2022-01-19
Payer: COMMERCIAL

## 2022-01-19 VITALS
BODY MASS INDEX: 21.2 KG/M2 | WEIGHT: 124.19 LBS | HEIGHT: 64 IN | DIASTOLIC BLOOD PRESSURE: 70 MMHG | SYSTOLIC BLOOD PRESSURE: 130 MMHG

## 2022-01-19 DIAGNOSIS — R35.0 FREQUENCY OF MICTURITION: ICD-10-CM

## 2022-01-19 DIAGNOSIS — R32 UNSPECIFIED URINARY INCONTINENCE: ICD-10-CM

## 2022-01-19 PROCEDURE — 99396 PREV VISIT EST AGE 40-64: CPT

## 2022-01-19 NOTE — HISTORY OF PRESENT ILLNESS
[N] : Patient is not sexually active [Y] : Positive pregnancy history [Menarche Age: ____] : age at menarche was [unfilled] [Menopause Age: ____] : age at menopause was [unfilled] [PGxTotal] : 1 [[facility]] : 1 [PGHxPremature] : 0 [PGHxAbortions] : 0 [[facility] Center[facility]] : 1 [PGHxABInduced] : 0 [PGHxABSpont] : 0 [PGHxEctopic] : 0 [PGHxMultBirths] : 0

## 2022-01-24 LAB — CYTOLOGY CVX/VAG DOC THIN PREP: NORMAL

## 2023-01-25 ENCOUNTER — APPOINTMENT (OUTPATIENT)
Dept: OBGYN | Facility: CLINIC | Age: 63
End: 2023-01-25
Payer: COMMERCIAL

## 2023-01-25 VITALS
BODY MASS INDEX: 21.17 KG/M2 | WEIGHT: 124 LBS | SYSTOLIC BLOOD PRESSURE: 135 MMHG | DIASTOLIC BLOOD PRESSURE: 82 MMHG | HEIGHT: 64 IN

## 2023-01-25 DIAGNOSIS — N81.6 RECTOCELE: ICD-10-CM

## 2023-01-25 PROCEDURE — 99396 PREV VISIT EST AGE 40-64: CPT

## 2023-01-25 NOTE — COUNSELING
[Nutrition/ Exercise/ Weight Management] : nutrition, exercise, weight management [Body Image] : body image [Vitamins/Supplements] : vitamins/supplements [Sunscreen] : sunscreen [Drugs/Alcohol] : drugs, alcohol [Breast Self Exam] : breast self exam [Bladder Hygiene] : bladder hygiene [Confidentiality] : confidentiality

## 2023-01-25 NOTE — HISTORY OF PRESENT ILLNESS
[N] : Patient is not sexually active [Y] : Positive pregnancy history [Menarche Age: ____] : age at menarche was [unfilled] [Menopause Age: ____] : age at menopause was [unfilled] [PGxTotal] : 1 [[facility]exFulerm] : 1 [PGHxAbortions] : 0 [PGHxPremature] : 0 [[facility] Corporationiving] : 1 [PGHxABInduced] : 0 [PGHxABSpont] : 0 [PGHxEctopic] : 0 [PGHxMultBirths] : 0

## 2023-01-25 NOTE — PHYSICAL EXAM
[Chaperone Present] : A chaperone was present in the examining room during all aspects of the physical examination [Appropriately responsive] : appropriately responsive [FreeTextEntry1] : Jonelle [Alert] : alert [No Acute Distress] : no acute distress [No Lymphadenopathy] : no lymphadenopathy [Regular Rate Rhythm] : regular rate rhythm [No Murmurs] : no murmurs [Clear to Auscultation B/L] : clear to auscultation bilaterally [Soft] : soft [Non-distended] : non-distended [Non-tender] : non-tender [No HSM] : No HSM [No Lesions] : no lesions [No Mass] : no mass [Oriented x3] : oriented x3 [Examination Of The Breasts] : a normal appearance [No Masses] : no breast masses were palpable [Vulvar Atrophy] : vulvar atrophy [Labia Majora] : normal [Labia Minora] : normal [Atrophy] : atrophy [Cystocele] : a cystocele [Dry Mucosa] : dry mucosa [Rectocele] : a rectocele [Normal] : normal [Uterine Adnexae] : normal

## 2023-01-30 LAB — CYTOLOGY CVX/VAG DOC THIN PREP: ABNORMAL

## 2023-03-28 ENCOUNTER — OFFICE (OUTPATIENT)
Dept: URBAN - METROPOLITAN AREA CLINIC 115 | Facility: CLINIC | Age: 63
Setting detail: OPHTHALMOLOGY
End: 2023-03-28
Payer: COMMERCIAL

## 2023-03-28 DIAGNOSIS — H04.121: ICD-10-CM

## 2023-03-28 DIAGNOSIS — H01.005: ICD-10-CM

## 2023-03-28 DIAGNOSIS — H04.122: ICD-10-CM

## 2023-03-28 DIAGNOSIS — H04.123: ICD-10-CM

## 2023-03-28 DIAGNOSIS — H01.002: ICD-10-CM

## 2023-03-28 DIAGNOSIS — H25.11: ICD-10-CM

## 2023-03-28 DIAGNOSIS — H25.12: ICD-10-CM

## 2023-03-28 DIAGNOSIS — H25.13: ICD-10-CM

## 2023-03-28 DIAGNOSIS — H43.393: ICD-10-CM

## 2023-03-28 DIAGNOSIS — H43.811: ICD-10-CM

## 2023-03-28 PROCEDURE — 92136 OPHTHALMIC BIOMETRY: CPT | Performed by: OPHTHALMOLOGY

## 2023-03-28 PROCEDURE — 99214 OFFICE O/P EST MOD 30 MIN: CPT | Performed by: OPHTHALMOLOGY

## 2023-03-28 ASSESSMENT — LID EXAM ASSESSMENTS
OS_BLEPHARITIS: LLL T
OD_BLEPHARITIS: RLL T

## 2023-03-28 ASSESSMENT — REFRACTION_AUTOREFRACTION
OS_SPHERE: -1.00
OD_CYLINDER: -1.50
OD_AXIS: 102
OS_AXIS: 009
OD_SPHERE: +0.25
OS_CYLINDER: -0.50

## 2023-03-28 ASSESSMENT — REFRACTION_MANIFEST
OD_CYLINDER: -1.00
OD_AXIS: 085
OD_VA2: 20/20
OS_AXIS: 085
OD_VA1: 20/25
OS_VA1: 20/25
OD_SPHERE: PLANO
OS_AXIS: 096
OD_CYLINDER: -1.25
OS_AXIS: 087
OU_VA: 20/20
OD_VA1: 20/20
OD_SPHERE: -1.00
OS_CYLINDER: -0.50
OS_CYLINDER: -0.25
OS_SPHERE: -0.50
OS_SPHERE: -1.25
OS_CYLINDER: -1.00
OS_VA1: 20/20
OD_AXIS: 105
OS_SPHERE: -0.50
OD_CYLINDER: -1.00
OD_ADD: +2.00
OD_AXIS: 091
OS_ADD: +2.00
OS_ADD: +2.00
OS_VA2: 20/20
OU_VA: 20/20
OS_VA1: 20/20
OD_VA1: 20/20
OD_ADD: +2.00
OD_SPHERE: -0.75

## 2023-03-28 ASSESSMENT — REFRACTION_CURRENTRX
OD_SPHERE: -1.00
OS_OVR_VA: 20/
OD_AXIS: 086
OD_VPRISM_DIRECTION: SV
OD_CYLINDER: -1.00
OD_OVR_VA: 20/
OS_AXIS: 081
OS_SPHERE: -1.25
OS_VPRISM_DIRECTION: SV
OS_CYLINDER: -0.50

## 2023-03-28 ASSESSMENT — TONOMETRY
OS_IOP_MMHG: 16
OD_IOP_MMHG: 12

## 2023-03-28 ASSESSMENT — VISUAL ACUITY
OS_BCVA: 20/30
OD_BCVA: 20/20-1

## 2023-03-28 ASSESSMENT — SPHEQUIV_DERIVED
OS_SPHEQUIV: -1.5
OD_SPHEQUIV: -1.5
OD_SPHEQUIV: -0.5
OS_SPHEQUIV: -1.25
OD_SPHEQUIV: -1.25
OS_SPHEQUIV: -1
OS_SPHEQUIV: -0.625

## 2023-03-28 ASSESSMENT — CONFRONTATIONAL VISUAL FIELD TEST (CVF)
OD_FINDINGS: FULL
OS_FINDINGS: FULL

## 2023-03-28 ASSESSMENT — SUPERFICIAL PUNCTATE KERATITIS (SPK)
OD_SPK: T
OS_SPK: T

## 2023-03-28 ASSESSMENT — PUNCTA - ASSESSMENT
OS_PUNCTA: SMALL
OD_PUNCTA: SMALL

## 2023-05-08 ENCOUNTER — INPATIENT (INPATIENT)
Facility: HOSPITAL | Age: 63
LOS: 0 days | Discharge: HOME | DRG: 641 | End: 2023-05-09
Attending: GENERAL ACUTE CARE HOSPITAL | Admitting: GENERAL ACUTE CARE HOSPITAL
Payer: COMMERCIAL

## 2023-05-08 VITALS
HEART RATE: 90 BPM | SYSTOLIC BLOOD PRESSURE: 165 MMHG | DIASTOLIC BLOOD PRESSURE: 98 MMHG | HEIGHT: 64 IN | TEMPERATURE: 98 F | WEIGHT: 111.99 LBS | RESPIRATION RATE: 18 BRPM | OXYGEN SATURATION: 96 %

## 2023-05-08 LAB
APTT BLD: 30.5 SEC — SIGNIFICANT CHANGE UP (ref 27.5–35.5)
FLUAV AG NPH QL: SIGNIFICANT CHANGE UP
FLUBV AG NPH QL: SIGNIFICANT CHANGE UP
INR BLD: 1.01 — SIGNIFICANT CHANGE UP (ref 0.88–1.16)
LIDOCAIN IGE QN: 56 U/L — SIGNIFICANT CHANGE UP (ref 7–60)
PROTHROM AB SERPL-ACNC: 12 SEC — SIGNIFICANT CHANGE UP (ref 10.5–13.4)
RSV RNA NPH QL NAA+NON-PROBE: SIGNIFICANT CHANGE UP
SARS-COV-2 RNA SPEC QL NAA+PROBE: SIGNIFICANT CHANGE UP

## 2023-05-08 PROCEDURE — 74019 RADEX ABDOMEN 2 VIEWS: CPT | Mod: 26

## 2023-05-08 PROCEDURE — 74177 CT ABD & PELVIS W/CONTRAST: CPT | Mod: 26,MA

## 2023-05-08 PROCEDURE — 99222 1ST HOSP IP/OBS MODERATE 55: CPT

## 2023-05-08 PROCEDURE — 99285 EMERGENCY DEPT VISIT HI MDM: CPT

## 2023-05-08 PROCEDURE — 71045 X-RAY EXAM CHEST 1 VIEW: CPT | Mod: 26

## 2023-05-08 RX ORDER — IOHEXOL 300 MG/ML
30 INJECTION, SOLUTION INTRAVENOUS ONCE
Refills: 0 | Status: COMPLETED | OUTPATIENT
Start: 2023-05-08 | End: 2023-05-08

## 2023-05-08 RX ORDER — SODIUM CHLORIDE 9 MG/ML
1000 INJECTION INTRAMUSCULAR; INTRAVENOUS; SUBCUTANEOUS ONCE
Refills: 0 | Status: COMPLETED | OUTPATIENT
Start: 2023-05-08 | End: 2023-05-08

## 2023-05-08 RX ORDER — ONDANSETRON 8 MG/1
4 TABLET, FILM COATED ORAL ONCE
Refills: 0 | Status: COMPLETED | OUTPATIENT
Start: 2023-05-08 | End: 2023-05-08

## 2023-05-08 RX ORDER — DIPHENHYDRAMINE HCL 50 MG
25 CAPSULE ORAL ONCE
Refills: 0 | Status: COMPLETED | OUTPATIENT
Start: 2023-05-08 | End: 2023-05-08

## 2023-05-08 RX ORDER — PROCHLORPERAZINE MALEATE 5 MG
10 TABLET ORAL ONCE
Refills: 0 | Status: COMPLETED | OUTPATIENT
Start: 2023-05-08 | End: 2023-05-08

## 2023-05-08 RX ORDER — FAMOTIDINE 10 MG/ML
20 INJECTION INTRAVENOUS ONCE
Refills: 0 | Status: COMPLETED | OUTPATIENT
Start: 2023-05-08 | End: 2023-05-08

## 2023-05-08 RX ORDER — PANTOPRAZOLE SODIUM 20 MG/1
40 TABLET, DELAYED RELEASE ORAL ONCE
Refills: 0 | Status: COMPLETED | OUTPATIENT
Start: 2023-05-08 | End: 2023-05-08

## 2023-05-08 RX ADMIN — Medication 25 MILLIGRAM(S): at 19:11

## 2023-05-08 RX ADMIN — SODIUM CHLORIDE 1000 MILLILITER(S): 9 INJECTION INTRAMUSCULAR; INTRAVENOUS; SUBCUTANEOUS at 19:16

## 2023-05-08 RX ADMIN — FAMOTIDINE 20 MILLIGRAM(S): 10 INJECTION INTRAVENOUS at 22:53

## 2023-05-08 RX ADMIN — SODIUM CHLORIDE 1000 MILLILITER(S): 9 INJECTION INTRAMUSCULAR; INTRAVENOUS; SUBCUTANEOUS at 22:11

## 2023-05-08 RX ADMIN — ONDANSETRON 4 MILLIGRAM(S): 8 TABLET, FILM COATED ORAL at 22:52

## 2023-05-08 RX ADMIN — PANTOPRAZOLE SODIUM 40 MILLIGRAM(S): 20 TABLET, DELAYED RELEASE ORAL at 17:10

## 2023-05-08 RX ADMIN — Medication 104 MILLIGRAM(S): at 20:56

## 2023-05-08 RX ADMIN — Medication 10 MILLIGRAM(S): at 22:11

## 2023-05-08 RX ADMIN — SODIUM CHLORIDE 1000 MILLILITER(S): 9 INJECTION INTRAMUSCULAR; INTRAVENOUS; SUBCUTANEOUS at 19:11

## 2023-05-08 RX ADMIN — SODIUM CHLORIDE 1000 MILLILITER(S): 9 INJECTION INTRAMUSCULAR; INTRAVENOUS; SUBCUTANEOUS at 17:10

## 2023-05-08 RX ADMIN — IOHEXOL 30 MILLILITER(S): 300 INJECTION, SOLUTION INTRAVENOUS at 17:10

## 2023-05-08 RX ADMIN — ONDANSETRON 4 MILLIGRAM(S): 8 TABLET, FILM COATED ORAL at 17:10

## 2023-05-08 RX ADMIN — SODIUM CHLORIDE 1000 MILLILITER(S): 9 INJECTION INTRAMUSCULAR; INTRAVENOUS; SUBCUTANEOUS at 22:52

## 2023-05-08 NOTE — ED ADULT NURSE NOTE - OBJECTIVE STATEMENT
Patient /co of intermittent abdominal pain w/ nausea and vomitting episodes, bile colored emesis, states some weakness, no lightheadedness/dizziness, states diarrhea episodes sometimes bloody, last BM yesterday, and decreased appetite X 1 month states weight loss of 40lbs the past month.  Also c/o of shaking tremors, no fever or chills.  FSBG 101.  EKG NSR in ED.

## 2023-05-08 NOTE — ED ADULT TRIAGE NOTE - CHIEF COMPLAINT QUOTE
Pt c/o vomiting, weakness x 1 week, CP x today. Pt reports "dark brown" emesis, when asked if looked like coffee grounds responds "yes". PMH HTN, denies taking HTN medication. Pt reports taking 1 extra strength tylenol PTA. Pt denies sob, abdominal pain, diarrhea, anticoagulant use. EKG in progress.

## 2023-05-08 NOTE — H&P ADULT - NSHPPHYSICALEXAM_GEN_ALL_CORE
VITAL SIGNS:  T(C): 36.7 (05-09-23 @ 01:33), Max: 37 (05-08-23 @ 22:10)  T(F): 98 (05-09-23 @ 01:33), Max: 98.6 (05-08-23 @ 22:10)  HR: 62 (05-09-23 @ 01:33) (62 - 98)  BP: 122/71 (05-09-23 @ 01:33) (122/71 - 165/98)  RR: 18 (05-09-23 @ 01:33) (16 - 18)  SpO2: 97% (05-09-23 @ 01:33) (96% - 100%) on RA       PHYSICAL EXAM:  Constitutional: resting comfortably in bed; NAD  Head: NC/AT  Eyes: PERRL, EOMI, anicteric sclera  ENT: no nasal discharge; uvula midline, no oropharyngeal erythema or exudates; MMM  Neck: supple; no JVD or thyromegaly  Respiratory: CTA B/L; no W/R/R, no retractions  Cardiac: +S1/S2; RRR; no M/R/G; PMI non-displaced  Gastrointestinal: abdomen soft, NT/ND; no rebound or guarding; +BSx4; neg Martines's or McBurney's sign  Extremities: WWP, no clubbing or cyanosis; no peripheral edema  Musculoskeletal: NROM x4; no joint swelling, tenderness or erythema  Neurologic: AAOx3; CNII-XII grossly intact; no focal deficits  Psychiatric: affect and characteristics of appearance, verbalizations, behaviors are appropriate

## 2023-05-08 NOTE — H&P ADULT - ATTENDING COMMENTS
62 y F PW nausea and vomiting, 1 month of loose stools- endorsed recent travel to [place]+    On arrival to ED VS: Afebrile, HR: 90. BP: 165/98, saturating 96% on RA   Significant labs: WBC 2.93, Plt 106, AST 94  CXR personally reviewed: no acute infiltrates or congestion  EKG personally reviewed: NSR no ST changes  CTAP: Enlarged liver with marked steatosis.    Admitted for vomiting, inability to tolerate PO, weakness	  #Vomiting   -Supportive care  -Zofran PRN, Reglan PRN Qtc 423    #Diarrhea  -Obtain GI PCR, stool culture/ova parasites, stool Na, K and osm. Once infection ruled out can start Imodium   -Gentle hydration, CLD can advance as tolerated     Rest as above

## 2023-05-08 NOTE — ED ADULT NURSE REASSESSMENT NOTE - NS ED NURSE REASSESS COMMENT FT1
Patient /aoX3, anxious, w/ ocassional shaking tremors, c/o of abdominal discomfort w/ episodes of nausea and vomitting bile colored emesis, no diarrhea, no dysuria, no urine output at this time.  No neuro deficits, no dizziness, no lightheadedness.  Vital signs stable.  Left AC PIV #20 in place, all labs sent , no complications.  NSS 1 L bolus ongoing, adminsitered Protonix IVP, Zofran 4mg IVP, oral contrast ongoing.  NPO observed.  CT scan pending.

## 2023-05-08 NOTE — ED ADULT NURSE REASSESSMENT NOTE - NS ED NURSE REASSESS COMMENT FT1
Compazine IVPB ongoing, patient states nausea improved, no new vomitting at this time.  No diarrhea.  States still has some weakness.  Patient able to ambulate w/ assist to bathroom and void but was unable to collect urine;  patient aware to collect urine on next voiding.  Vital signs stable.  For possible admit.  NPO observed.

## 2023-05-08 NOTE — H&P ADULT - ASSESSMENT
This is a 61 yo F with no significant PMHx who presented to the ED after n/v for 1 week with CP pain that began on 5/8; furthermore, the pt complains of diarrhea that has lasted for the past 1 month.

## 2023-05-08 NOTE — H&P ADULT - PROBLEM SELECTOR PLAN 2
WBC 2.93. No hx of infection nor malignancy, eats 3 meals, no sick contacts. May be 2/2 dilution s/p 3L IVF v. GI (viral) infection.    Plan:  - F/u AM CBC

## 2023-05-08 NOTE — ED ADULT NURSE REASSESSMENT NOTE - NS ED NURSE REASSESS COMMENT FT1
Patient a/oX3, anxious, c/o of continued nausea and episodes of bile colored vomitus, no diarrhea, c/o of weakness.  Vital signs stable.  Additional NSS 1 L bolus ongoing, Benadryl 25mg IVP administered.  Compazine IVPB pending for pharmacy.  CT scan pending.  NPO observed.

## 2023-05-08 NOTE — H&P ADULT - NSICDXFAMILYHX_GEN_ALL_CORE_FT
FAMILY HISTORY:  Mother  Still living? Unknown  Family history of uterine cancer, Age at diagnosis: Age Unknown

## 2023-05-08 NOTE — H&P ADULT - PROBLEM SELECTOR PLAN 1
3 episodes of n/v in the ED, afebrile, nontoxic appearing, CT A/P showing no bowel obstruction. Feeling better after zofran. S/p 3 L NS bolus.    Plan:  - Monitor electrolytes  - PRN 4 mg zofran  - Encourage PO intake  - F/u GI PCR 3 episodes of n/v in the ED, afebrile, nontoxic appearing, CT A/P showing no bowel obstruction. Feeling better after zofran. S/p 3 L NS bolus.    Plan:  - Monitor electrolytes  - PRN 4 mg zofran  - Encourage PO intake  - F/u GI PCR  - F/u stool studies

## 2023-05-08 NOTE — H&P ADULT - PROBLEM SELECTOR PLAN 4
Fluids: s/p 3L IVF  Electrolytes: Mg>2, K>4  Nutrition: regular  Prophylaxis: lovenox  Activity: AAT, OOBTC  GI: none  C: FC  Dispo: Admit to [facility]

## 2023-05-08 NOTE — H&P ADULT - NSHPLABSRESULTS_GEN_ALL_CORE
13.8   2.93  )-----------( 106      ( 08 May 2023 16:53 )             38.7       05-08    138  |  98  |  6<L>  ----------------------------<  102<H>  3.9   |  24  |  0.52    Ca    8.6      08 May 2023 16:53  Mg     2.0     05-08    TPro  7.9  /  Alb  4.7  /  TBili  0.6  /  DBili  x   /  AST  94<H>  /  ALT  33  /  AlkPhos  71  05-08    RADIOLOGY & ADDITIONAL TESTS REVIEWED: yes  < from: CT Abdomen and Pelvis w/ Oral Cont and w/ IV Cont (05.08.23 @ 18:22) >    IMPRESSION:  No bowel obstruction.  Enlarged liver with marked steatosis.    < end of copied text >

## 2023-05-08 NOTE — ED PROVIDER NOTE - CLINICAL SUMMARY MEDICAL DECISION MAKING FREE TEXT BOX
weight loss diarrhea- now vomiting- ill appearing tremulous- labs, antiemetics, ivf, xray abd- then CT a/p

## 2023-05-08 NOTE — H&P ADULT - HISTORY OF PRESENT ILLNESS
This is a 63 yo F with no significant PMHx who presented to the ED after n/v for 1 week with CP pain that began on 5/8; furthermore, the pt complains of diarrhea that has lasted for the past 1 month. She denies any hx of autoimmune and IBD/IBS dx. The last time she saw her PCP was 1 year ago and was told that her lab work was ok. She denies being around any sick contacts, HA, LOC, cough, SOB, palpitations, skin changes, or LE swelling. She denies any changes to her food, abx use, hospitalizations, or food intolerances.     ED Course  Vitals: 97.9F, HR 72-90, 165/98 to 130/78, RR 16-18, SpO2 100% on RA   Significant Labs: WBC 2.93, BUN 6, glucose 102, AST 94  EKG: NSR 85 bpm, QTc 423, no signs of ischemia/infarcts  Imaging: CT A/P and abd XR  Interventions: pepcid 20 mg x1, zofran 4 mg z1, PPI 40 mg x1, NS bolus 1L x3 This is a 61 yo F with no significant PMHx who presented to the ED after n/v for 1 week with CP pain that began on 5/8; furthermore, the pt complains of diarrhea that has lasted for the past 1 month. She denies any hx of autoimmune and IBD/IBS dx. The last time she saw her PCP was 1 year ago and was told that her lab work was ok. She denies being around any sick contacts, HA, LOC, cough, SOB, palpitations, skin changes, or LE swelling. She denies any changes to her food, abx use, hospitalizations, or food intolerances. Pt states she went to [facility] 1 month ago.     ED Course  Vitals: 97.9F, HR 72-90, 165/98 to 130/78, RR 16-18, SpO2 100% on RA   Significant Labs: WBC 2.93, BUN 6, glucose 102, AST 94  EKG: NSR 85 bpm, QTc 423, no signs of ischemia/infarcts  Imaging: CT A/P and abd XR  Interventions: pepcid 20 mg x1, zofran 4 mg z1, PPI 40 mg x1, NS bolus 1L x3

## 2023-05-08 NOTE — ED PROVIDER NOTE - PROGRESS NOTE DETAILS
lito / angela  received on sign out. pt w one month gi symptoms. not tolerating po.   thus far labs ok. at time of sign out pending CT. dispo per imaging / if pt able to tolerating po. elenaker -   CTAP - no acute findings. pt still not tolerating po. lightheaded / weak. near-syncopal when standing. will give more fluids / meds.  admit for further hydration.   orthostatics positive - HR +30bpm on standing.

## 2023-05-08 NOTE — ED PROVIDER NOTE - OBJECTIVE STATEMENT
62 F co vomiting x 1 week- nausea and vomiting- whenever she eats/drinks anything- starts vomiting shortly after- has had 1 month of loose stools- up to 3-4 times per day- no recnt hospitalizations/no recent abx use  no blood/mucous in stools no black stool/bloody stool  + upper abd pain  mod severity  no prior abd surgeries  exac- food  no sig allev factors

## 2023-05-09 ENCOUNTER — TRANSCRIPTION ENCOUNTER (OUTPATIENT)
Age: 63
End: 2023-05-09

## 2023-05-09 VITALS
RESPIRATION RATE: 18 BRPM | HEART RATE: 61 BPM | DIASTOLIC BLOOD PRESSURE: 74 MMHG | SYSTOLIC BLOOD PRESSURE: 116 MMHG | OXYGEN SATURATION: 99 % | TEMPERATURE: 99 F

## 2023-05-09 DIAGNOSIS — D72.819 DECREASED WHITE BLOOD CELL COUNT, UNSPECIFIED: ICD-10-CM

## 2023-05-09 DIAGNOSIS — Z29.9 ENCOUNTER FOR PROPHYLACTIC MEASURES, UNSPECIFIED: ICD-10-CM

## 2023-05-09 DIAGNOSIS — R11.2 NAUSEA WITH VOMITING, UNSPECIFIED: ICD-10-CM

## 2023-05-09 DIAGNOSIS — D69.6 THROMBOCYTOPENIA, UNSPECIFIED: ICD-10-CM

## 2023-05-09 LAB
ALBUMIN SERPL ELPH-MCNC: 3.9 G/DL — SIGNIFICANT CHANGE UP (ref 3.3–5)
ALP SERPL-CCNC: 61 U/L — SIGNIFICANT CHANGE UP (ref 40–120)
ALT FLD-CCNC: 25 U/L — SIGNIFICANT CHANGE UP (ref 10–45)
ANION GAP SERPL CALC-SCNC: 14 MMOL/L — SIGNIFICANT CHANGE UP (ref 5–17)
APPEARANCE UR: CLEAR — SIGNIFICANT CHANGE UP
AST SERPL-CCNC: 69 U/L — HIGH (ref 10–40)
BASOPHILS # BLD AUTO: 0.03 K/UL — SIGNIFICANT CHANGE UP (ref 0–0.2)
BASOPHILS NFR BLD AUTO: 0.9 % — SIGNIFICANT CHANGE UP (ref 0–2)
BILIRUB SERPL-MCNC: 0.9 MG/DL — SIGNIFICANT CHANGE UP (ref 0.2–1.2)
BILIRUB UR-MCNC: NEGATIVE — SIGNIFICANT CHANGE UP
BUN SERPL-MCNC: 4 MG/DL — LOW (ref 7–23)
CALCIUM SERPL-MCNC: 8.3 MG/DL — LOW (ref 8.4–10.5)
CHLORIDE SERPL-SCNC: 102 MMOL/L — SIGNIFICANT CHANGE UP (ref 96–108)
CK MB CFR SERPL CALC: 3.5 NG/ML — SIGNIFICANT CHANGE UP (ref 0–6.7)
CK SERPL-CCNC: 197 U/L — HIGH (ref 25–170)
CO2 SERPL-SCNC: 21 MMOL/L — LOW (ref 22–31)
COLOR SPEC: YELLOW — SIGNIFICANT CHANGE UP
CREAT SERPL-MCNC: 0.64 MG/DL — SIGNIFICANT CHANGE UP (ref 0.5–1.3)
DIFF PNL FLD: NEGATIVE — SIGNIFICANT CHANGE UP
EGFR: 99 ML/MIN/1.73M2 — SIGNIFICANT CHANGE UP
EOSINOPHIL # BLD AUTO: 0.04 K/UL — SIGNIFICANT CHANGE UP (ref 0–0.5)
EOSINOPHIL NFR BLD AUTO: 1.2 % — SIGNIFICANT CHANGE UP (ref 0–6)
GLUCOSE SERPL-MCNC: 68 MG/DL — LOW (ref 70–99)
GLUCOSE UR QL: NEGATIVE — SIGNIFICANT CHANGE UP
HCT VFR BLD CALC: 38.7 % — SIGNIFICANT CHANGE UP (ref 34.5–45)
HCV AB S/CO SERPL IA: 0.09 S/CO — SIGNIFICANT CHANGE UP (ref 0–0.99)
HCV AB SERPL-IMP: SIGNIFICANT CHANGE UP
HGB BLD-MCNC: 12.9 G/DL — SIGNIFICANT CHANGE UP (ref 11.5–15.5)
IMM GRANULOCYTES NFR BLD AUTO: 0.6 % — SIGNIFICANT CHANGE UP (ref 0–0.9)
KETONES UR-MCNC: 40 MG/DL
LEUKOCYTE ESTERASE UR-ACNC: NEGATIVE — SIGNIFICANT CHANGE UP
LYMPHOCYTES # BLD AUTO: 0.73 K/UL — LOW (ref 1–3.3)
LYMPHOCYTES # BLD AUTO: 22.1 % — SIGNIFICANT CHANGE UP (ref 13–44)
MAGNESIUM SERPL-MCNC: 1.8 MG/DL — SIGNIFICANT CHANGE UP (ref 1.6–2.6)
MCHC RBC-ENTMCNC: 33.3 GM/DL — SIGNIFICANT CHANGE UP (ref 32–36)
MCHC RBC-ENTMCNC: 35.3 PG — HIGH (ref 27–34)
MCV RBC AUTO: 106 FL — HIGH (ref 80–100)
MONOCYTES # BLD AUTO: 0.37 K/UL — SIGNIFICANT CHANGE UP (ref 0–0.9)
MONOCYTES NFR BLD AUTO: 11.2 % — SIGNIFICANT CHANGE UP (ref 2–14)
NEUTROPHILS # BLD AUTO: 2.11 K/UL — SIGNIFICANT CHANGE UP (ref 1.8–7.4)
NEUTROPHILS NFR BLD AUTO: 64 % — SIGNIFICANT CHANGE UP (ref 43–77)
NITRITE UR-MCNC: NEGATIVE — SIGNIFICANT CHANGE UP
NRBC # BLD: 0 /100 WBCS — SIGNIFICANT CHANGE UP (ref 0–0)
PH UR: 6.5 — SIGNIFICANT CHANGE UP (ref 5–8)
PHOSPHATE SERPL-MCNC: 2.4 MG/DL — LOW (ref 2.5–4.5)
PLATELET # BLD AUTO: 80 K/UL — LOW (ref 150–400)
POTASSIUM SERPL-MCNC: 3.6 MMOL/L — SIGNIFICANT CHANGE UP (ref 3.5–5.3)
POTASSIUM SERPL-SCNC: 3.6 MMOL/L — SIGNIFICANT CHANGE UP (ref 3.5–5.3)
PROT SERPL-MCNC: 6.5 G/DL — SIGNIFICANT CHANGE UP (ref 6–8.3)
PROT UR-MCNC: NEGATIVE MG/DL — SIGNIFICANT CHANGE UP
RBC # BLD: 3.65 M/UL — LOW (ref 3.8–5.2)
RBC # FLD: 13.1 % — SIGNIFICANT CHANGE UP (ref 10.3–14.5)
SODIUM SERPL-SCNC: 137 MMOL/L — SIGNIFICANT CHANGE UP (ref 135–145)
SP GR SPEC: 1.01 — SIGNIFICANT CHANGE UP (ref 1–1.03)
UROBILINOGEN FLD QL: 0.2 E.U./DL — SIGNIFICANT CHANGE UP
WBC # BLD: 3.3 K/UL — LOW (ref 3.8–10.5)
WBC # FLD AUTO: 3.3 K/UL — LOW (ref 3.8–10.5)

## 2023-05-09 PROCEDURE — 85730 THROMBOPLASTIN TIME PARTIAL: CPT

## 2023-05-09 PROCEDURE — 82553 CREATINE MB FRACTION: CPT

## 2023-05-09 PROCEDURE — 82962 GLUCOSE BLOOD TEST: CPT

## 2023-05-09 PROCEDURE — 99285 EMERGENCY DEPT VISIT HI MDM: CPT | Mod: 25

## 2023-05-09 PROCEDURE — 86803 HEPATITIS C AB TEST: CPT

## 2023-05-09 PROCEDURE — 36415 COLL VENOUS BLD VENIPUNCTURE: CPT

## 2023-05-09 PROCEDURE — 84100 ASSAY OF PHOSPHORUS: CPT

## 2023-05-09 PROCEDURE — 96375 TX/PRO/DX INJ NEW DRUG ADDON: CPT

## 2023-05-09 PROCEDURE — 74019 RADEX ABDOMEN 2 VIEWS: CPT

## 2023-05-09 PROCEDURE — 93005 ELECTROCARDIOGRAM TRACING: CPT

## 2023-05-09 PROCEDURE — 83735 ASSAY OF MAGNESIUM: CPT

## 2023-05-09 PROCEDURE — 85025 COMPLETE CBC W/AUTO DIFF WBC: CPT

## 2023-05-09 PROCEDURE — 87637 SARSCOV2&INF A&B&RSV AMP PRB: CPT

## 2023-05-09 PROCEDURE — 82550 ASSAY OF CK (CPK): CPT

## 2023-05-09 PROCEDURE — 80053 COMPREHEN METABOLIC PANEL: CPT

## 2023-05-09 PROCEDURE — 96374 THER/PROPH/DIAG INJ IV PUSH: CPT

## 2023-05-09 PROCEDURE — 71045 X-RAY EXAM CHEST 1 VIEW: CPT

## 2023-05-09 PROCEDURE — 81003 URINALYSIS AUTO W/O SCOPE: CPT

## 2023-05-09 PROCEDURE — 99232 SBSQ HOSP IP/OBS MODERATE 35: CPT

## 2023-05-09 PROCEDURE — 0241U: CPT

## 2023-05-09 PROCEDURE — 83690 ASSAY OF LIPASE: CPT

## 2023-05-09 PROCEDURE — 74177 CT ABD & PELVIS W/CONTRAST: CPT | Mod: MA

## 2023-05-09 PROCEDURE — 85610 PROTHROMBIN TIME: CPT

## 2023-05-09 RX ORDER — ACETAMINOPHEN 500 MG
650 TABLET ORAL EVERY 6 HOURS
Refills: 0 | Status: DISCONTINUED | OUTPATIENT
Start: 2023-05-09 | End: 2023-05-09

## 2023-05-09 RX ORDER — SODIUM CHLORIDE 9 MG/ML
1000 INJECTION, SOLUTION INTRAVENOUS
Refills: 0 | Status: DISCONTINUED | OUTPATIENT
Start: 2023-05-09 | End: 2023-05-09

## 2023-05-09 RX ORDER — ONDANSETRON 8 MG/1
4 TABLET, FILM COATED ORAL EVERY 8 HOURS
Refills: 0 | Status: DISCONTINUED | OUTPATIENT
Start: 2023-05-09 | End: 2023-05-09

## 2023-05-09 RX ORDER — ENOXAPARIN SODIUM 100 MG/ML
40 INJECTION SUBCUTANEOUS EVERY 24 HOURS
Refills: 0 | Status: DISCONTINUED | OUTPATIENT
Start: 2023-05-09 | End: 2023-05-09

## 2023-05-09 RX ADMIN — ENOXAPARIN SODIUM 40 MILLIGRAM(S): 100 INJECTION SUBCUTANEOUS at 06:19

## 2023-05-09 RX ADMIN — SODIUM CHLORIDE 75 MILLILITER(S): 9 INJECTION, SOLUTION INTRAVENOUS at 02:46

## 2023-05-09 NOTE — PROGRESS NOTE ADULT - PROBLEM SELECTOR PLAN 4
Fluids: s/p 3L IVF  Electrolytes: Mg>2, K>4  Nutrition: regular  Prophylaxis: lovenox  Activity: AAT, OOBTC  GI: none  C: FC  Dispo: Admit to [facility]

## 2023-05-09 NOTE — DISCHARGE NOTE PROVIDER - NSDCFUADDAPPT_GEN_ALL_CORE_FT
Please follow-up with your primary care physician on discharge. Please follow-up with your primary care physician shortly after discharge.

## 2023-05-09 NOTE — DISCHARGE NOTE PROVIDER - NSDCCPCAREPLAN_GEN_ALL_CORE_FT
PRINCIPAL DISCHARGE DIAGNOSIS  Diagnosis: Nausea and vomiting  Assessment and Plan of Treatment: You were admitted to the hospital for nausea, vomiting, and diarrhea. You were treated with nausea medication and IV fluids which improved your dehydration. On discharge, please continue to stay well hydrated.      SECONDARY DISCHARGE DIAGNOSES  Diagnosis: Thrombocytopenia  Assessment and Plan of Treatment: You were found to have low platelets while you were in the hospital. It is unclear why your platelets were low, or how long they have been low for. Please follow-up with your primary care provider soon after discharge to re-check your platelet levels.

## 2023-05-09 NOTE — DISCHARGE NOTE NURSING/CASE MANAGEMENT/SOCIAL WORK - PATIENT PORTAL LINK FT
You can access the FollowMyHealth Patient Portal offered by [facility] by registering at the following website: [email or web address]. By joining [facility]’s FollowMyHealth portal, you will also be able to view your health information using other applications (apps) compatible with our system.

## 2023-05-09 NOTE — PROGRESS NOTE ADULT - ASSESSMENT
This is a 63 yo F with no significant PMHx who presented to the ED after n/v for 1 week with CP pain that began on 5/8; furthermore, the pt complains of diarrhea that has lasted for the past 1 month.

## 2023-05-09 NOTE — DISCHARGE NOTE PROVIDER - HOSPITAL COURSE
Pt unable to urinate. Dr Geo George aware. #Discharge: do not delete    This is a 61 yo F with no significant PMHx who presented to the ED after n/v for 1 week with CP pain that began on 5/8; furthermore, the pt complains of diarrhea that has lasted for the past 1 month.      Problem List/Main Diagnoses (system-based):  1. Nausea, vomiting, and diarrhea: 3 episodes of n/v in the ED, afebrile, nontoxic appearing, CT A/P showing no bowel obstruction. Feeling better after zofran. S/p 3 L NS bolus. Pt did not have any additional diarrhea while in hospital.     2. Leukopenia: WBC 2.93. No hx of infection nor malignancy, eats 3 meals, no sick contacts. May be 2/2 dilution s/p 3L IVF v. GI (viral) infection.  - Repeat CBC as outpatient     3. Thrombocytopenia: Plt 106 -> 80 on admission. No skin changes or signs of bleeding. May be 2/2 dilution vs GI (viral) infection.   - Repeat CBC as outpatient        Inpatient treatment course:         Patient was discharged to: (home/[facility]/acute rehab/hospice, etc, and with what services – home health PT/RN? Home O2?)         New medications:    Changes to old medications:    Medications that were stopped:         Items to follow up as outpatient:         Physical exam at the time of discharge: #Discharge: do not delete    This is a 61 yo F with no significant PMHx who presented to the ED after n/v for 1 week with CP pain that began on 5/8; furthermore, the pt complains of diarrhea that has lasted for the past 1 month.      Problem List/Main Diagnoses (system-based):  1. Nausea, vomiting, and diarrhea: 3 episodes of n/v in the ED, afebrile, nontoxic appearing, CT A/P showing no bowel obstruction. Feeling better after zofran. S/p 3 L NS bolus. Pt did not have any additional diarrhea while in hospital.     2. Leukopenia: WBC 2.93. No hx of infection nor malignancy, eats 3 meals, no sick contacts. May be 2/2 dilution s/p 3L IVF v. GI (viral) infection.  - Repeat CBC as outpatient     3. Thrombocytopenia: Plt 106 -> 80 on admission. No skin changes or signs of bleeding. May be 2/2 dilution vs GI (viral) infection.   - Repeat CBC as outpatient        Patient was discharged to: Home       New medications: None    Changes to old medications: None    Medications that were stopped: None         Items to follow up as outpatient: PCP         Physical exam at the time of discharge:  Constitutional: resting comfortably in bed; NAD  Head: NC/AT  Eyes: PERRL, EOMI, anicteric sclera  ENT: no nasal discharge; uvula midline, no oropharyngeal erythema or exudates; MMM  Neck: supple; no JVD or thyromegaly  Respiratory: CTA B/L; no W/R/R, no retractions  Cardiac: +S1/S2; RRR; no M/R/G; PMI non-displaced  Gastrointestinal: abdomen soft, NT/ND; no rebound or guarding; +BSx4; neg Martines's or McBurney's sign  Extremities: WWP, no clubbing or cyanosis; no peripheral edema  Musculoskeletal: NROM x4; no joint swelling, tenderness or erythema  Neurologic: AAOx3; CNII-XII grossly intact; no focal deficits  Psychiatric: affect and characteristics of appearance, verbalizations, behaviors are appropriate

## 2023-05-09 NOTE — PROGRESS NOTE ADULT - SUBJECTIVE AND OBJECTIVE BOX
Patient is a 62y old  Female who presents with a chief complaint of Vomiting (09 May 2023 12:36)      INTERVAL HPI/OVERNIGHT EVENTS:  Patient seen and examined at bedside. No acute events overnight. This morning, pt in NAD, with no acute medical complaints. Feels better after IV fluids. Has not had any additional diarrhea since arrival to hospital. Denies fever, chills, HA, dizziness, CP, SOB, palpitations, abdominal pain, n/v, constipation, dysuria, hematuria, urinary frequency/urgency, numbness, or tingling.       FAMILY HISTORY:  Family history of uterine cancer (Mother)        VITAL SIGNS:  T(C): 37 (23 @ 13:49), Max: 37.2 (23 @ 06:30)  HR: 61 (23 @ 13:49) (59 - 98)  BP: 116/74 (23 @ 13:49) (112/61 - 165/98)  RR: 18 (23 @ 13:49) (16 - 18)  SpO2: 99% (23 @ 13:49) (96% - 100%)  Wt(kg): --Vital Signs Last 24 Hrs  T(C): 37 (09 May 2023 13:49), Max: 37.2 (09 May 2023 06:30)  T(F): 98.6 (09 May 2023 13:49), Max: 98.9 (09 May 2023 06:30)  HR: 61 (09 May 2023 13:49) (59 - 98)  BP: 116/74 (09 May 2023 13:49) (112/61 - 165/98)  BP(mean): --  RR: 18 (09 May 2023 13:49) (16 - 18)  SpO2: 99% (09 May 2023 13:49) (96% - 100%)    Parameters below as of 09 May 2023 13:49  Patient On (Oxygen Delivery Method): room air      penicillin (Other)      PHYSICAL EXAM:  Constitutional: resting comfortably in bed; NAD  Head: NC/AT  Eyes: PERRL, EOMI, anicteric sclera  ENT: no nasal discharge; uvula midline, no oropharyngeal erythema or exudates; MMM  Neck: supple; no JVD or thyromegaly  Respiratory: CTA B/L; no W/R/R, no retractions  Cardiac: +S1/S2; RRR; no M/R/G; PMI non-displaced  Gastrointestinal: abdomen soft, NT/ND; no rebound or guarding; +BSx4; neg Martines's or McBurney's sign  Extremities: WWP, no clubbing or cyanosis; no peripheral edema  Musculoskeletal: NROM x4; no joint swelling, tenderness or erythema  Neurologic: AAOx3; CNII-XII grossly intact; no focal deficits  Psychiatric: affect and characteristics of appearance, verbalizations, behaviors are appropriate    Consultant(s) Notes Reviewed:  [x ] YES  [ ] NO  Care Discussed with Consultants/Other Providers [ x] YES  [ ] NO    LABS:                        12.9   3.30  )-----------( 80       ( 09 May 2023 08:30 )             38.7     05-09    137  |  102  |  4<L>  ----------------------------<  68<L>  3.6   |  21<L>  |  0.64    Ca    8.3<L>      09 May 2023 08:30  Phos  2.4     05-  Mg     1.8     05-    TPro  6.5  /  Alb  3.9  /  TBili  0.9  /  DBili  x   /  AST  69<H>  /  ALT  25  /  AlkPhos  61  05-09      PT/INR - ( 08 May 2023 16:53 )   PT: 12.0 sec;   INR: 1.01          PTT - ( 08 May 2023 16:53 )  PTT:30.5 sec  CAPILLARY BLOOD GLUCOSE      POCT Blood Glucose.: 101 mg/dL (08 May 2023 15:03)      CARDIAC MARKERS ( 08 May 2023 16:53 )  x     / x     / 197 U/L / x     / 3.5 ng/mL          Urinalysis Basic - ( 09 May 2023 12:21 )    Color: Yellow / Appearance: Clear / S.010 / pH: x  Gluc: x / Ketone: 40 mg/dL  / Bili: Negative / Urobili: 0.2 E.U./dL   Blood: x / Protein: NEGATIVE mg/dL / Nitrite: NEGATIVE   Leuk Esterase: NEGATIVE / RBC: x / WBC x   Sq Epi: x / Non Sq Epi: x / Bacteria: x                I & O Summary:      Microbiology:    Urinalysis with Rflx Culture (collected 23 @ 12:21)        Urinalysis with Rflx Culture (collected 09 May 2023 12:21)        RADIOLOGY, EKG AND ADDITIONAL TESTS: Reviewed.      Urinalysis with Rflx Culture (collected 23 @ 12:21)      RADIOLOGY & ADDITIONAL TESTS:    Imaging Personally Reviewed:  [ ] YES  [ ] NO  acetaminophen     Tablet .. 650 milliGRAM(s) Oral every 6 hours PRN  enoxaparin Injectable 40 milliGRAM(s) SubCutaneous every 24 hours  lactated ringers. 1000 milliLiter(s) IV Continuous <Continuous>  ondansetron Injectable 4 milliGRAM(s) IV Push every 8 hours PRN      HEALTH ISSUES - PROBLEM Dx:  Prophylactic measure    Leukopenia    Thrombocytopenia    Nausea vomiting and diarrhea

## 2023-05-09 NOTE — CHART NOTE - NSCHARTNOTEFT_GEN_A_CORE
Patient was seen in hallway, fully dressed and wishing to leave. I explained to her the importance of staying to re-check her platelet levels to ensure they were stable. Patient did not wish to stay to re-check her platelet levels. Pt was asymptomatic, with no signs or symptoms of bleeding. I encouraged patient to follow-up closely with her outpatient primary care provider to re-check her platelet levels.

## 2023-05-09 NOTE — DISCHARGE NOTE NURSING/CASE MANAGEMENT/SOCIAL WORK - NSDCPEFALRISK_GEN_ALL_CORE
For information on Fall & Injury Prevention, visit: [email or web address] OR  [email or web address] OR  https://www.cdc.gov/steadi/patient.html

## 2023-05-09 NOTE — PROGRESS NOTE ADULT - ATTENDING COMMENTS
Pt seen and examined by me case d/w housestaff agree with VS, PE, assessment and plan as outlined above with following additives    1- Intractable nausea/vomiting: likely viral in nature  symptoms have resolved  CT with no acute pathology  advance diet  2- Thrombocytopenia- unclear etiology possibly 2/2 viral illness  no schistocytes on smear  fu repeat CBC

## 2023-05-09 NOTE — PROGRESS NOTE ADULT - PROBLEM SELECTOR PLAN 2
WBC 2.93. No hx of infection nor malignancy, eats 3 meals, no sick contacts. May be 2/2 dilution s/p 3L IVF v. GI (viral) infection.    Plan:  - Monitor CBC

## 2023-05-09 NOTE — PROGRESS NOTE ADULT - PROBLEM SELECTOR PLAN 1
3 episodes of n/v in the ED, afebrile, nontoxic appearing, CT A/P showing no bowel obstruction. Feeling better after zofran. S/p 3 L NS bolus.    Plan:  - Monitor electrolytes  - PRN 4 mg zofran  - Encourage PO intake  - F/u GI PCR  - F/u stool studies

## 2023-05-10 ENCOUNTER — APPOINTMENT (OUTPATIENT)
Dept: FAMILY MEDICINE | Facility: CLINIC | Age: 63
End: 2023-05-10
Payer: COMMERCIAL

## 2023-05-10 VITALS
HEIGHT: 64 IN | OXYGEN SATURATION: 98 % | BODY MASS INDEX: 20.83 KG/M2 | DIASTOLIC BLOOD PRESSURE: 90 MMHG | SYSTOLIC BLOOD PRESSURE: 140 MMHG | HEART RATE: 87 BPM | WEIGHT: 122 LBS | TEMPERATURE: 97.9 F | RESPIRATION RATE: 14 BRPM

## 2023-05-10 DIAGNOSIS — E86.0 DEHYDRATION: ICD-10-CM

## 2023-05-10 PROCEDURE — 36415 COLL VENOUS BLD VENIPUNCTURE: CPT

## 2023-05-10 PROCEDURE — 99496 TRANSJ CARE MGMT HIGH F2F 7D: CPT | Mod: 25

## 2023-05-10 RX ORDER — SERTRALINE 25 MG/1
25 TABLET, FILM COATED ORAL
Qty: 90 | Refills: 1 | Status: DISCONTINUED | COMMUNITY
Start: 2021-12-15 | End: 2023-05-10

## 2023-05-10 NOTE — HEALTH RISK ASSESSMENT
[Intercurrent hospitalizations] : was admitted to the hospital  [Yes] : Yes [No] : In the past 12 months have you used drugs other than those required for medical reasons? No [No falls in past year] : Patient reported no falls in the past year [0] : 2) Feeling down, depressed, or hopeless: Not at all (0) [PHQ-2 Negative - No further assessment needed] : PHQ-2 Negative - No further assessment needed [None] : None [Alone] : lives alone [Employed] : employed [Fully functional (bathing, dressing, toileting, transferring, walking, feeding)] : Fully functional (bathing, dressing, toileting, transferring, walking, feeding) [Fully functional (using the telephone, shopping, preparing meals, housekeeping, doing laundry, using] : Fully functional and needs no help or supervision to perform IADLs (using the telephone, shopping, preparing meals, housekeeping, doing laundry, using transportation, managing medications and managing finances) [With Patient/Caregiver] : , with patient/caregiver [Designated Healthcare Proxy] : Designated healthcare proxy [Name: ___] : Health Care Proxy's Name: [unfilled]  [Relationship: ___] : Relationship: [unfilled] [de-identified] : 1 glass with dinner [[id number]] : 0 [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

## 2023-05-10 NOTE — PHYSICAL EXAM
[No Acute Distress] : no acute distress [Well Nourished] : well nourished [Well Developed] : well developed [Well-Appearing] : well-appearing [Normal Sclera/Conjunctiva] : normal sclera/conjunctiva [PERRL] : pupils equal round and reactive to light [EOMI] : extraocular movements intact [Normal Outer Ear/Nose] : the outer ears and nose were normal in appearance [Normal Oropharynx] : the oropharynx was normal [No JVD] : no jugular venous distention [No Lymphadenopathy] : no lymphadenopathy [Supple] : supple [Thyroid Normal, No Nodules] : the thyroid was normal and there were no nodules present [No Respiratory Distress] : no respiratory distress  [No Accessory Muscle Use] : no accessory muscle use [Clear to Auscultation] : lungs were clear to auscultation bilaterally [Normal Rate] : normal rate  [Regular Rhythm] : with a regular rhythm [Normal S1, S2] : normal S1 and S2 [No Murmur] : no murmur heard [No Carotid Bruits] : no carotid bruits [No Abdominal Bruit] : a ~M bruit was not heard ~T in the abdomen [No Varicosities] : no varicosities [Pedal Pulses Present] : the pedal pulses are present [No Edema] : there was no peripheral edema [No Palpable Aorta] : no palpable aorta [No Extremity Clubbing/Cyanosis] : no extremity clubbing/cyanosis [Soft] : abdomen soft [Non Tender] : non-tender [Non-distended] : non-distended [No HSM] : no HSM [No Masses] : no abdominal mass palpated [Normal Bowel Sounds] : normal bowel sounds [Normal Posterior Cervical Nodes] : no posterior cervical lymphadenopathy [Normal Anterior Cervical Nodes] : no anterior cervical lymphadenopathy [No CVA Tenderness] : no CVA  tenderness [No Spinal Tenderness] : no spinal tenderness [No Joint Swelling] : no joint swelling [Grossly Normal Strength/Tone] : grossly normal strength/tone [No Rash] : no rash [Coordination Grossly Intact] : coordination grossly intact [No Focal Deficits] : no focal deficits [Normal Gait] : normal gait [Deep Tendon Reflexes (DTR)] : deep tendon reflexes were 2+ and symmetric [Normal Affect] : the affect was normal [Normal Insight/Judgement] : insight and judgment were intact [92661 - High Complexity requires an extensive number of possible diagnoses and/or the management options, extensive complexity of the medical data (tests, etc.) to be reviewed, and a high risk of significant complications, morbidity, and/or mortality as w] : High Complexity

## 2023-05-10 NOTE — HISTORY OF PRESENT ILLNESS
[Post-hospitalization from ___ Hospital] : Post-hospitalization from [unfilled] Hospital [Admitted on: ___] : The patient was admitted on [unfilled] [Discharged on ___] : discharged on [unfilled] [Discharge Summary] : discharge summary [Pertinent Labs] : pertinent labs [Radiology Findings] : radiology findings [Discharge Med List] : discharge medication list [Med Reconciliation] : medication reconciliation has been completed [FreeTextEntry2] : Pt with no significant past medical hx. Pt admitted with Nausea, vomiting and diarrhea. Treated with IVF, which improved Dehydration.\par She was found with low platelets and advise to f/up with PCP.\par Pt feels good today. She states is afraid to eat after the severe vomiting.

## 2023-05-10 NOTE — ASSESSMENT
[FreeTextEntry1] : 63 y/o F with no significant past medical hx admitted 5/8/23 with Nausea, vomiting and diarrhea\par \par Hospital d/c f/up:\par # Dehydration\par -Improved with IVF\par -Advise BRAT diet and progress as tolerated\par \par Thrombocytopenia:\par -Repeat CBC\par -Hematology eval \par \par \par \par

## 2023-05-11 DIAGNOSIS — D69.6 THROMBOCYTOPENIA, UNSPECIFIED: ICD-10-CM

## 2023-05-11 DIAGNOSIS — B34.9 VIRAL INFECTION, UNSPECIFIED: ICD-10-CM

## 2023-05-11 DIAGNOSIS — Z88.0 ALLERGY STATUS TO PENICILLIN: ICD-10-CM

## 2023-05-11 DIAGNOSIS — E86.0 DEHYDRATION: ICD-10-CM

## 2023-05-11 DIAGNOSIS — Z80.49 FAMILY HISTORY OF MALIGNANT NEOPLASM OF OTHER GENITAL ORGANS: ICD-10-CM

## 2023-05-11 LAB
BASOPHILS # BLD AUTO: 0.02 K/UL
BASOPHILS NFR BLD AUTO: 0.5 %
EOSINOPHIL # BLD AUTO: 0.04 K/UL
EOSINOPHIL NFR BLD AUTO: 1 %
FOLATE SERPL-MCNC: 10.2 NG/ML
HCT VFR BLD CALC: 40.9 %
HGB BLD-MCNC: 14.1 G/DL
IMM GRANULOCYTES NFR BLD AUTO: 0.5 %
LYMPHOCYTES # BLD AUTO: 0.75 K/UL
LYMPHOCYTES NFR BLD AUTO: 18 %
MAN DIFF?: NORMAL
MCHC RBC-ENTMCNC: 34.5 GM/DL
MCHC RBC-ENTMCNC: 35.4 PG
MCV RBC AUTO: 102.8 FL
MONOCYTES # BLD AUTO: 0.4 K/UL
MONOCYTES NFR BLD AUTO: 9.6 %
NEUTROPHILS # BLD AUTO: 2.94 K/UL
NEUTROPHILS NFR BLD AUTO: 70.4 %
PLATELET # BLD AUTO: 103 K/UL
RBC # BLD: 3.98 M/UL
RBC # FLD: 13.2 %
VIT B12 SERPL-MCNC: 404 PG/ML
WBC # FLD AUTO: 4.17 K/UL

## 2023-05-12 LAB
ALBUMIN SERPL ELPH-MCNC: 5.1 G/DL
ALP BLD-CCNC: 69 U/L
ALT SERPL-CCNC: 23 U/L
ANION GAP SERPL CALC-SCNC: 21 MMOL/L
AST SERPL-CCNC: 63 U/L
BILIRUB SERPL-MCNC: 0.7 MG/DL
BUN SERPL-MCNC: 5 MG/DL
CALCIUM SERPL-MCNC: 9.7 MG/DL
CHLORIDE SERPL-SCNC: 100 MMOL/L
CO2 SERPL-SCNC: 19 MMOL/L
CREAT SERPL-MCNC: 0.56 MG/DL
EGFR: 102 ML/MIN/1.73M2
GLUCOSE SERPL-MCNC: 101 MG/DL
POTASSIUM SERPL-SCNC: 3.6 MMOL/L
PROT SERPL-MCNC: 7.7 G/DL
SODIUM SERPL-SCNC: 139 MMOL/L

## 2023-05-15 NOTE — ED ADULT TRIAGE NOTE - HEIGHT IN INCHES
5 Orbicularis Oris Muscle Flap Text: Due to the deep nature of the defect, location near free margin, and to restore oral sphincter function, an orbicularis oris muscle flap was planned.  Using a sterile surgical marker, an appropriate flap was drawn incorporating the defect. The area thus outlined was incised and advanced into place, re-establishing function and closing the wound, and secured with layered stitches.

## 2023-05-17 ENCOUNTER — TRANSCRIPTION ENCOUNTER (OUTPATIENT)
Age: 63
End: 2023-05-17

## 2023-05-22 LAB — FERRITIN SERPL-MCNC: 1421 NG/ML

## 2023-05-24 ENCOUNTER — OUTPATIENT (OUTPATIENT)
Dept: OUTPATIENT SERVICES | Facility: HOSPITAL | Age: 63
LOS: 1 days | Discharge: HOME | End: 2023-05-24

## 2023-05-24 DIAGNOSIS — R79.9 ABNORMAL FINDING OF BLOOD CHEMISTRY, UNSPECIFIED: ICD-10-CM

## 2023-06-05 ENCOUNTER — RESULT REVIEW (OUTPATIENT)
Age: 63
End: 2023-06-05

## 2023-06-05 ENCOUNTER — APPOINTMENT (OUTPATIENT)
Dept: HEMATOLOGY ONCOLOGY | Facility: CLINIC | Age: 63
End: 2023-06-05
Payer: COMMERCIAL

## 2023-06-05 VITALS
HEART RATE: 91 BPM | BODY MASS INDEX: 20.83 KG/M2 | WEIGHT: 122 LBS | SYSTOLIC BLOOD PRESSURE: 132 MMHG | DIASTOLIC BLOOD PRESSURE: 82 MMHG | OXYGEN SATURATION: 91 % | TEMPERATURE: 97 F | HEIGHT: 64 IN

## 2023-06-05 LAB
BASOPHILS # BLD AUTO: 0 K/UL — SIGNIFICANT CHANGE UP (ref 0–0.2)
BASOPHILS NFR BLD AUTO: 1.1 % — SIGNIFICANT CHANGE UP (ref 0–2)
EOSINOPHIL # BLD AUTO: 0.2 K/UL — SIGNIFICANT CHANGE UP (ref 0–0.5)
EOSINOPHIL NFR BLD AUTO: 4.3 % — SIGNIFICANT CHANGE UP (ref 0–6)
HCT VFR BLD CALC: 43.4 % — SIGNIFICANT CHANGE UP (ref 34.5–45)
HGB BLD-MCNC: 15.8 G/DL — HIGH (ref 11.5–15.5)
LYMPHOCYTES # BLD AUTO: 1.8 K/UL — SIGNIFICANT CHANGE UP (ref 1–3.3)
LYMPHOCYTES # BLD AUTO: 44.5 % — HIGH (ref 13–44)
MCHC RBC-ENTMCNC: 35.6 PG — HIGH (ref 27–34)
MCHC RBC-ENTMCNC: 36.4 G/DL — HIGH (ref 32–36)
MCV RBC AUTO: 97.8 FL — SIGNIFICANT CHANGE UP (ref 80–100)
MONOCYTES # BLD AUTO: 0.4 K/UL — SIGNIFICANT CHANGE UP (ref 0–0.9)
MONOCYTES NFR BLD AUTO: 9.9 % — SIGNIFICANT CHANGE UP (ref 2–14)
NEUTROPHILS # BLD AUTO: 1.6 K/UL — LOW (ref 1.8–7.4)
NEUTROPHILS NFR BLD AUTO: 40.2 % — LOW (ref 43–77)
PLATELET # BLD AUTO: 167 K/UL — SIGNIFICANT CHANGE UP (ref 150–400)
RBC # BLD: 4.43 M/UL — SIGNIFICANT CHANGE UP (ref 3.8–5.2)
RBC # FLD: 11.6 % — SIGNIFICANT CHANGE UP (ref 10.3–14.5)
WBC # BLD: 4 K/UL — SIGNIFICANT CHANGE UP (ref 3.8–10.5)
WBC # FLD AUTO: 4 K/UL — SIGNIFICANT CHANGE UP (ref 3.8–10.5)

## 2023-06-05 PROCEDURE — 99203 OFFICE O/P NEW LOW 30 MIN: CPT

## 2023-06-06 ENCOUNTER — NON-APPOINTMENT (OUTPATIENT)
Age: 63
End: 2023-06-06

## 2023-06-06 LAB
ALBUMIN SERPL ELPH-MCNC: 5.2 G/DL
ALP BLD-CCNC: 65 U/L
ALT SERPL-CCNC: 58 U/L
ANION GAP SERPL CALC-SCNC: 15 MMOL/L
AST SERPL-CCNC: 113 U/L
BILIRUB SERPL-MCNC: 0.4 MG/DL
BUN SERPL-MCNC: 7 MG/DL
CALCIUM SERPL-MCNC: 9.6 MG/DL
CHLORIDE SERPL-SCNC: 105 MMOL/L
CO2 SERPL-SCNC: 26 MMOL/L
CREAT SERPL-MCNC: 0.56 MG/DL
EGFR: 102 ML/MIN/1.73M2
ERYTHROCYTE [SEDIMENTATION RATE] IN BLOOD BY WESTERGREN METHOD: 15 MM/HR
FERRITIN SERPL-MCNC: 1801 NG/ML
GLUCOSE SERPL-MCNC: 98 MG/DL
IRON SATN MFR SERPL: 45 %
IRON SERPL-MCNC: 128 UG/DL
POTASSIUM SERPL-SCNC: 4.5 MMOL/L
PROT SERPL-MCNC: 7.9 G/DL
SODIUM SERPL-SCNC: 146 MMOL/L
TIBC SERPL-MCNC: 286 UG/DL
UIBC SERPL-MCNC: 159 UG/DL

## 2023-06-07 LAB — ANA SER IF-ACNC: NEGATIVE

## 2023-06-07 NOTE — REASON FOR VISIT
[Initial Consultation] : an initial consultation for [FreeTextEntry2] : Thrombocytopenia, elevated ferritin level, neutropenia

## 2023-06-07 NOTE — CONSULT LETTER
[Dear  ___] : Dear  [unfilled], [Consult Letter:] : I had the pleasure of evaluating your patient, [unfilled]. [FreeTextEntry2] : Dr. Griffith [FreeTextEntry1] : Please find attached our consultation on your patient, Ms. ANNA MARIE LEMA . \par We take a multidisciplinary team approach to patient care and consider you, the referring physician, an extension of our team. \par It is our commitment to provide your patient with the highest quality of advanced therapeutic options. We thank you for allowing us to participate in the care of your patient.\par Please do not hesitate to contact our team with any questions or concerns at [phone].\par \par \par

## 2023-06-07 NOTE — ASSESSMENT
[FreeTextEntry1] : Mrs. Martinez is a 63 yo WF referred by Dr. Griffith for thrombocytopenia, elevated ferritin level and neutropenia.\par Juan Minor was in her usual state of health until after her son's wedding when she began to experience diarrhea. After a few weeks she finally went to the ER at [facility], on May 8th, 2023, where she received IV hydration for dehydration. Lab work in the hospital showed a WBC count of 2.9, Platelet count of 80,000, ANC-1.95, NCV-106, AST 94. A CT of the Abdomen revealed hepatomegaly withe steatosis.\par She was seen by her primary care MD on 5-10-23 and platelet count was 103,000, WBC-4.17, MCV-102.8, ferritin 1421. Her AST and ALT have been intermittently elevated since March of 2021.She has been checked for Hepatitis and HIV and these were negative.\par  Today her CBC shows WBC-4.0, HGb-15.8, HCT-43.4, Plat-167,000, MCV-97, ANC- 1.6. I am sending off repeat CMP, iron studies and a flow cytometry, to R/O a hematologic cause of neutropenia. Platelet count is now normal.\par  Elevated ferritin could have been elevated in response to acute illness. If still elevated, will bring her back in to do a Hemochromatosis panel.Sending off an NINA and Sed rate as a screen for an autoimmune process. I will notify her of results and plan.May need to see a Hepatologist

## 2023-06-07 NOTE — HISTORY OF PRESENT ILLNESS
[de-identified] : Mrs. Martinez is a 63 yo WF referred by Dr. Griffith for thrombocytopenia, elevated ferritin level and neutropenia.\par Juan Minor was in her usual state of health until after her son's wedding when she began to experience diarrhea. After a few weeks she finally went to the ER at [facility], on May 8th, 2023, where she received IV hydration for dehydration. Lab work in the hospital showed a WBC count of 2.9, Platelet count of 80,000, ANC-1.95, NCV-106, AST 94. A CT of the Abdomen revealed hepatomegaly withe steatosis.\par She was seen by her primary care MD on 5-10-23 and platelet count was 103,000, WBC-4.17, MCV-102.8, ferritin 1421. Her AST and ALT have been intermittently elevated since March of 2021.She has been checked for Hepatitis and HIV and these were negative. [de-identified] : She is feeling much better, no further diarrhea. he denies any evidence of GI or  bleeding or bruising.. no signs of infection.

## 2023-06-12 ENCOUNTER — NON-APPOINTMENT (OUTPATIENT)
Age: 63
End: 2023-06-12

## 2023-07-07 ENCOUNTER — APPOINTMENT (OUTPATIENT)
Dept: HEMATOLOGY ONCOLOGY | Facility: CLINIC | Age: 63
End: 2023-07-07

## 2023-08-21 ENCOUNTER — OUTPATIENT (OUTPATIENT)
Dept: OUTPATIENT SERVICES | Facility: HOSPITAL | Age: 63
LOS: 1 days | Discharge: HOME | End: 2023-08-21

## 2023-08-21 DIAGNOSIS — R79.9 ABNORMAL FINDING OF BLOOD CHEMISTRY, UNSPECIFIED: ICD-10-CM

## 2023-08-25 ENCOUNTER — APPOINTMENT (OUTPATIENT)
Dept: HEMATOLOGY ONCOLOGY | Facility: CLINIC | Age: 63
End: 2023-08-25

## 2023-08-25 ENCOUNTER — APPOINTMENT (OUTPATIENT)
Dept: HEMATOLOGY ONCOLOGY | Facility: CLINIC | Age: 63
End: 2023-08-25
Payer: COMMERCIAL

## 2023-08-25 VITALS
HEART RATE: 90 BPM | DIASTOLIC BLOOD PRESSURE: 93 MMHG | WEIGHT: 113.32 LBS | SYSTOLIC BLOOD PRESSURE: 133 MMHG | TEMPERATURE: 98.6 F | OXYGEN SATURATION: 94 % | HEIGHT: 64 IN | BODY MASS INDEX: 19.35 KG/M2

## 2023-08-25 PROCEDURE — 99213 OFFICE O/P EST LOW 20 MIN: CPT

## 2023-08-28 ENCOUNTER — RESULT REVIEW (OUTPATIENT)
Age: 63
End: 2023-08-28

## 2023-08-28 ENCOUNTER — APPOINTMENT (OUTPATIENT)
Dept: HEMATOLOGY ONCOLOGY | Facility: CLINIC | Age: 63
End: 2023-08-28

## 2023-08-28 LAB
BASOPHILS # BLD AUTO: 0 K/UL — SIGNIFICANT CHANGE UP (ref 0–0.2)
BASOPHILS NFR BLD AUTO: 1.7 % — SIGNIFICANT CHANGE UP (ref 0–2)
EOSINOPHIL # BLD AUTO: 0.1 K/UL — SIGNIFICANT CHANGE UP (ref 0–0.5)
EOSINOPHIL NFR BLD AUTO: 2.3 % — SIGNIFICANT CHANGE UP (ref 0–6)
HCT VFR BLD CALC: 43.5 % — SIGNIFICANT CHANGE UP (ref 34.5–45)
HGB BLD-MCNC: 15.6 G/DL — HIGH (ref 11.5–15.5)
LYMPHOCYTES # BLD AUTO: 0.9 K/UL — LOW (ref 1–3.3)
LYMPHOCYTES # BLD AUTO: 35.3 % — SIGNIFICANT CHANGE UP (ref 13–44)
MCHC RBC-ENTMCNC: 36 G/DL — SIGNIFICANT CHANGE UP (ref 32–36)
MCHC RBC-ENTMCNC: 36.3 PG — HIGH (ref 27–34)
MCV RBC AUTO: 100.9 FL — HIGH (ref 80–100)
MONOCYTES # BLD AUTO: 0.4 K/UL — SIGNIFICANT CHANGE UP (ref 0–0.9)
MONOCYTES NFR BLD AUTO: 16.3 % — HIGH (ref 2–14)
NEUTROPHILS # BLD AUTO: 1.2 K/UL — LOW (ref 1.8–7.4)
NEUTROPHILS NFR BLD AUTO: 44.3 % — SIGNIFICANT CHANGE UP (ref 43–77)
PLATELET # BLD AUTO: 108 K/UL — LOW (ref 150–400)
RBC # BLD: 4.31 M/UL — SIGNIFICANT CHANGE UP (ref 3.8–5.2)
RBC # FLD: 12.2 % — SIGNIFICANT CHANGE UP (ref 10.3–14.5)
WBC # BLD: 2.6 K/UL — LOW (ref 3.8–10.5)
WBC # FLD AUTO: 2.6 K/UL — LOW (ref 3.8–10.5)

## 2023-09-06 ENCOUNTER — OFFICE (OUTPATIENT)
Dept: URBAN - METROPOLITAN AREA CLINIC 115 | Facility: CLINIC | Age: 63
Setting detail: OPHTHALMOLOGY
End: 2023-09-06

## 2023-09-06 ENCOUNTER — APPOINTMENT (OUTPATIENT)
Dept: FAMILY MEDICINE | Facility: CLINIC | Age: 63
End: 2023-09-06
Payer: COMMERCIAL

## 2023-09-06 VITALS
HEIGHT: 64 IN | HEART RATE: 90 BPM | DIASTOLIC BLOOD PRESSURE: 74 MMHG | WEIGHT: 111 LBS | BODY MASS INDEX: 18.95 KG/M2 | RESPIRATION RATE: 14 BRPM | OXYGEN SATURATION: 96 % | TEMPERATURE: 97.5 F | SYSTOLIC BLOOD PRESSURE: 118 MMHG

## 2023-09-06 DIAGNOSIS — R63.4 ABNORMAL WEIGHT LOSS: ICD-10-CM

## 2023-09-06 DIAGNOSIS — Z91.81 HISTORY OF FALLING: ICD-10-CM

## 2023-09-06 DIAGNOSIS — D70.9 NEUTROPENIA, UNSPECIFIED: ICD-10-CM

## 2023-09-06 DIAGNOSIS — F41.8 OTHER SPECIFIED ANXIETY DISORDERS: ICD-10-CM
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
## 2023-09-06 PROCEDURE — 99214 OFFICE O/P EST MOD 30 MIN: CPT

## 2023-09-06 PROCEDURE — NO SHOW FE NO SHOW FEE: Performed by: OPHTHALMOLOGY

## 2023-09-06 NOTE — PLAN
no cough/no exertional dyspnea/no hemoptysis/no orthopnea
[FreeTextEntry1] : NOte for work provided to avoid commuting since episodes of fall and Unsteady gait.

## 2023-09-06 NOTE — HEALTH RISK ASSESSMENT
[Yes] : Yes [4 or more  times a week (4 pts)] : 4 or more  times a week (4 points) [1 or 2 (0 pts)] : 1 or 2 (0 points) [Never (0 pts)] : Never (0 points) [No] : In the past 12 months have you used drugs other than those required for medical reasons? No [Two or more falls in past year] : Patient reported two or more falls in the past year [2] : 2) Feeling down, depressed, or hopeless for more than half of the days (2) [PHQ-2 Positive] : PHQ-2 Positive [1/2 of Days or More (2)] : 4.) Feeling tired or having little energy? Half the days or more [Nearly Every Day (3)] : 5.) Poor appetite or overeating? Nearly every day [Several Days (1)] : 8.) Moving or speaking so slowly that other people could have noticed, or the opposite, moving or speaking faster than usual? Several days [Moderate] : severity of depression is moderate [Very Difficult] : How difficult have these problems made it for you to do your work, take care of things at home, or get along with people? Very difficult [PHQ-9 Positive] : PHQ-9 Positive [de-identified] : Poor, cannot tolerate many foods. Has Ensure, some carbs in the morning.  [de-identified] : Patient falls 2/2 lightheadedness/faintness.  [[facility] [place]] : 7 [DXO6Ymopc] : 2 [[id number]] : 12 [Never] : Never

## 2023-09-06 NOTE — REVIEW OF SYSTEMS
[Negative] : Heme/Lymph [Recent Change In Weight] : ~T recent weight change [Nausea] : nausea [Diarrhea] : diarrhea [Vomiting] : vomiting [Unsteady Walking] : ataxia

## 2023-09-06 NOTE — ASSESSMENT
[FreeTextEntry1] : # Episodic Faintness/Falls x 6 times since 2023 - Bloodwork with Hematologist from 23 reviewed.  - Likely 2/2 inability to eat, which may be a result of an underlying condition involving an issue with her liver function.  -CT abdomen done 2023 -F/up with GI 10/02/23  # Abnormal LFT's/Fatty liver/Significantly elevated Ferritin: - Bloodwork from 23 reviewed, significantly elevated LFTs and Ferritin.   # Depression screenin - Counseling in meds risks and benefits - Continue sertraline 25mg - Referral for Psychiatry provided today.  # HCM: - Mammo/ Gyn: up to date  - Colon Cancer screening: FOBT kit with instructions - Immunizations: Had 2 doses COVID vaccine - Further recommendations with lab results.

## 2023-09-06 NOTE — HISTORY OF PRESENT ILLNESS
[FreeTextEntry1] : F/up s/p multiple falls at work. [de-identified] : 62 y.o. F hx abnormal LFTs/Fatty liver, thrombocytopenia, elevated ferritin, neutropenia, presents today for evaluation s/p multiple episodes of falling at work.   Patient states she fell at work 6 times, the first fall was sometime in June 2023 with the last fall being 09/02/23. Patient reports that prior to falling each time she feels a sensation of lightheadedness, faint, vision becomes fuzzy, cold and clammy feeling on her face. Denies LOC during these episodes. Reports after a minute or so symptoms will resolve.   Patient lost 35lbs. in 5-months because she feels sick after she eats. She reports epigastric pain, nausea, and vomiting, when she eats. She also reports having diarrhea after she eats. Patient reports that all of her symptoms began after her son's wedding in [place] in May 2023.   Today, patient did have some food this morning, she does not report the faintness at this time.  Patient saw Heme/Onc. and had bloodwork done 08/25/2023.  She has schedule to f/up with GI 10/02/23 Had COVID vaccine PFizer.  She reports anxiety and feeling down; patient has not seen a psychiatrist but is interested in going. She did start 25mg Sertraline QAM, with some relief of depressive symptoms.

## 2023-09-08 ENCOUNTER — EMERGENCY (EMERGENCY)
Facility: HOSPITAL | Age: 63
LOS: 1 days | Discharge: HOME | End: 2023-09-08
Attending: EMERGENCY MEDICINE
Payer: COMMERCIAL

## 2023-09-08 ENCOUNTER — APPOINTMENT (OUTPATIENT)
Dept: HEMATOLOGY ONCOLOGY | Facility: CLINIC | Age: 63
End: 2023-09-08
Payer: COMMERCIAL

## 2023-09-08 ENCOUNTER — NON-APPOINTMENT (OUTPATIENT)
Age: 63
End: 2023-09-08

## 2023-09-08 VITALS
HEIGHT: 65 IN | DIASTOLIC BLOOD PRESSURE: 96 MMHG | WEIGHT: 110.01 LBS | SYSTOLIC BLOOD PRESSURE: 145 MMHG | HEART RATE: 84 BPM | RESPIRATION RATE: 20 BRPM | OXYGEN SATURATION: 96 % | TEMPERATURE: 98 F

## 2023-09-08 VITALS
OXYGEN SATURATION: 95 % | HEIGHT: 64 IN | TEMPERATURE: 98.3 F | HEART RATE: 88 BPM | DIASTOLIC BLOOD PRESSURE: 84 MMHG | SYSTOLIC BLOOD PRESSURE: 135 MMHG

## 2023-09-08 VITALS
RESPIRATION RATE: 20 BRPM | HEART RATE: 90 BPM | TEMPERATURE: 98 F | SYSTOLIC BLOOD PRESSURE: 134 MMHG | DIASTOLIC BLOOD PRESSURE: 89 MMHG | OXYGEN SATURATION: 97 %

## 2023-09-08 DIAGNOSIS — R79.89 OTHER SPECIFIED ABNORMAL FINDINGS OF BLOOD CHEMISTRY: ICD-10-CM

## 2023-09-08 DIAGNOSIS — K92.1 MELENA: ICD-10-CM

## 2023-09-08 DIAGNOSIS — F10.20 ALCOHOL DEPENDENCE, UNCOMPLICATED: ICD-10-CM

## 2023-09-08 DIAGNOSIS — D69.6 THROMBOCYTOPENIA, UNSPECIFIED: ICD-10-CM

## 2023-09-08 DIAGNOSIS — R29.6 REPEATED FALLS: ICD-10-CM

## 2023-09-08 DIAGNOSIS — K92.2 GASTROINTESTINAL HEMORRHAGE, UNSPECIFIED: ICD-10-CM

## 2023-09-08 DIAGNOSIS — Z14.8 GENETIC CARRIER OF OTHER DISEASE: ICD-10-CM

## 2023-09-08 LAB
ALBUMIN SERPL ELPH-MCNC: 4.4 G/DL — SIGNIFICANT CHANGE UP (ref 3.3–5.2)
ALBUMIN SERPL ELPH-MCNC: 4.6 G/DL
ALP BLD-CCNC: 121 U/L
ALP SERPL-CCNC: 111 U/L — SIGNIFICANT CHANGE UP (ref 40–120)
ALT FLD-CCNC: 51 U/L — HIGH
ALT SERPL-CCNC: 72 U/L
ANION GAP SERPL CALC-SCNC: 13 MMOL/L
ANION GAP SERPL CALC-SCNC: 19 MMOL/L — HIGH (ref 5–17)
ANISOCYTOSIS BLD QL: SLIGHT — SIGNIFICANT CHANGE UP
APTT BLD: 30.6 SEC — SIGNIFICANT CHANGE UP (ref 24.5–35.6)
AST SERPL-CCNC: 195 U/L — HIGH
AST SERPL-CCNC: 266 U/L
BASOPHILS # BLD AUTO: 0.03 K/UL — SIGNIFICANT CHANGE UP (ref 0–0.2)
BASOPHILS NFR BLD AUTO: 0.9 % — SIGNIFICANT CHANGE UP (ref 0–2)
BILIRUB SERPL-MCNC: 0.6 MG/DL
BILIRUB SERPL-MCNC: 1.1 MG/DL — SIGNIFICANT CHANGE UP (ref 0.4–2)
BLD GP AB SCN SERPL QL: SIGNIFICANT CHANGE UP
BUN SERPL-MCNC: 4.9 MG/DL — LOW (ref 8–20)
BUN SERPL-MCNC: 5 MG/DL
CALCIUM SERPL-MCNC: 8.9 MG/DL — SIGNIFICANT CHANGE UP (ref 8.4–10.5)
CALCIUM SERPL-MCNC: 9.7 MG/DL
CHLORIDE SERPL-SCNC: 100 MMOL/L
CHLORIDE SERPL-SCNC: 98 MMOL/L — SIGNIFICANT CHANGE UP (ref 96–108)
CO2 SERPL-SCNC: 23 MMOL/L — SIGNIFICANT CHANGE UP (ref 22–29)
CO2 SERPL-SCNC: 29 MMOL/L
CREAT SERPL-MCNC: 0.39 MG/DL — LOW (ref 0.5–1.3)
CREAT SERPL-MCNC: 0.6 MG/DL
EGFR: 101 ML/MIN/1.73M2
EGFR: 112 ML/MIN/1.73M2 — SIGNIFICANT CHANGE UP
EOSINOPHIL # BLD AUTO: 0 K/UL — SIGNIFICANT CHANGE UP (ref 0–0.5)
EOSINOPHIL NFR BLD AUTO: 0 % — SIGNIFICANT CHANGE UP (ref 0–6)
FERRITIN SERPL-MCNC: 7304 NG/ML
GLUCOSE SERPL-MCNC: 100 MG/DL
GLUCOSE SERPL-MCNC: 76 MG/DL — SIGNIFICANT CHANGE UP (ref 70–99)
HCT VFR BLD CALC: 42.1 % — SIGNIFICANT CHANGE UP (ref 34.5–45)
HGB BLD-MCNC: 15.1 G/DL — SIGNIFICANT CHANGE UP (ref 11.5–15.5)
INR BLD: 1.02 RATIO — SIGNIFICANT CHANGE UP (ref 0.85–1.18)
IRON SATN MFR SERPL: 67 %
IRON SERPL-MCNC: 147 UG/DL
LYMPHOCYTES # BLD AUTO: 0.83 K/UL — LOW (ref 1–3.3)
LYMPHOCYTES # BLD AUTO: 25.2 % — SIGNIFICANT CHANGE UP (ref 13–44)
MACROCYTES BLD QL: SLIGHT — SIGNIFICANT CHANGE UP
MANUAL SMEAR VERIFICATION: SIGNIFICANT CHANGE UP
MCHC RBC-ENTMCNC: 35.9 GM/DL — SIGNIFICANT CHANGE UP (ref 32–36)
MCHC RBC-ENTMCNC: 36.7 PG — HIGH (ref 27–34)
MCV RBC AUTO: 102.2 FL — HIGH (ref 80–100)
METAMYELOCYTES # FLD: 0.9 % — HIGH (ref 0–0)
MONOCYTES # BLD AUTO: 0.34 K/UL — SIGNIFICANT CHANGE UP (ref 0–0.9)
MONOCYTES NFR BLD AUTO: 10.4 % — SIGNIFICANT CHANGE UP (ref 2–14)
NEUTROPHILS # BLD AUTO: 2 K/UL — SIGNIFICANT CHANGE UP (ref 1.8–7.4)
NEUTROPHILS NFR BLD AUTO: 60.9 % — SIGNIFICANT CHANGE UP (ref 43–77)
PLAT MORPH BLD: NORMAL — SIGNIFICANT CHANGE UP
PLATELET # BLD AUTO: 102 K/UL — LOW (ref 150–400)
POLYCHROMASIA BLD QL SMEAR: SLIGHT — SIGNIFICANT CHANGE UP
POTASSIUM SERPL-MCNC: 4.6 MMOL/L — SIGNIFICANT CHANGE UP (ref 3.5–5.3)
POTASSIUM SERPL-SCNC: 4 MMOL/L
POTASSIUM SERPL-SCNC: 4.6 MMOL/L — SIGNIFICANT CHANGE UP (ref 3.5–5.3)
PROT SERPL-MCNC: 7.3 G/DL
PROT SERPL-MCNC: 7.8 G/DL — SIGNIFICANT CHANGE UP (ref 6.6–8.7)
PROTHROM AB SERPL-ACNC: 11.3 SEC — SIGNIFICANT CHANGE UP (ref 9.5–13)
RBC # BLD: 4.12 M/UL — SIGNIFICANT CHANGE UP (ref 3.8–5.2)
RBC # FLD: 13.7 % — SIGNIFICANT CHANGE UP (ref 10.3–14.5)
RBC BLD AUTO: ABNORMAL
SODIUM SERPL-SCNC: 139 MMOL/L — SIGNIFICANT CHANGE UP (ref 135–145)
SODIUM SERPL-SCNC: 141 MMOL/L
TIBC SERPL-MCNC: 221 UG/DL
TM INTERPRETATION: NORMAL
UIBC SERPL-MCNC: 73 UG/DL
VARIANT LYMPHS # BLD: 1.7 % — SIGNIFICANT CHANGE UP (ref 0–6)
WBC # BLD: 3.28 K/UL — LOW (ref 3.8–10.5)
WBC # FLD AUTO: 3.28 K/UL — LOW (ref 3.8–10.5)

## 2023-09-08 PROCEDURE — 99285 EMERGENCY DEPT VISIT HI MDM: CPT

## 2023-09-08 PROCEDURE — 99284 EMERGENCY DEPT VISIT MOD MDM: CPT | Mod: 25

## 2023-09-08 PROCEDURE — 74177 CT ABD & PELVIS W/CONTRAST: CPT | Mod: MG

## 2023-09-08 PROCEDURE — 86850 RBC ANTIBODY SCREEN: CPT

## 2023-09-08 PROCEDURE — 74177 CT ABD & PELVIS W/CONTRAST: CPT | Mod: 26,MG

## 2023-09-08 PROCEDURE — 85730 THROMBOPLASTIN TIME PARTIAL: CPT

## 2023-09-08 PROCEDURE — 86900 BLOOD TYPING SEROLOGIC ABO: CPT

## 2023-09-08 PROCEDURE — 85025 COMPLETE CBC W/AUTO DIFF WBC: CPT

## 2023-09-08 PROCEDURE — G1004: CPT

## 2023-09-08 PROCEDURE — 80053 COMPREHEN METABOLIC PANEL: CPT

## 2023-09-08 PROCEDURE — 86901 BLOOD TYPING SEROLOGIC RH(D): CPT

## 2023-09-08 PROCEDURE — 99205 OFFICE O/P NEW HI 60 MIN: CPT

## 2023-09-08 PROCEDURE — 36415 COLL VENOUS BLD VENIPUNCTURE: CPT

## 2023-09-08 PROCEDURE — 96374 THER/PROPH/DIAG INJ IV PUSH: CPT | Mod: XU

## 2023-09-08 PROCEDURE — 85610 PROTHROMBIN TIME: CPT

## 2023-09-08 RX ORDER — PANTOPRAZOLE SODIUM 20 MG/1
40 TABLET, DELAYED RELEASE ORAL ONCE
Refills: 0 | Status: COMPLETED | OUTPATIENT
Start: 2023-09-08 | End: 2023-09-08

## 2023-09-08 RX ADMIN — PANTOPRAZOLE SODIUM 40 MILLIGRAM(S): 20 TABLET, DELAYED RELEASE ORAL at 12:09

## 2023-09-08 NOTE — ASSESSMENT
[FreeTextEntry1] : Given above history, exam and previous labs, patient directed to go to the ED immediately. Concerned for PUD, esophageal varices, vitamin deficiencies, and alcohol related liver disease. Patient also fall and safety risk given recurrent falls and lives alone, her mother is her only family in the area and her son lives in [place] primarily. Patient also is actively drinking and has never stopped, high risk for alcohol related injuries and withdrawal seizures if she were to abruptly stop. Should also be evaluated for infectious etiologies given bloody diarrhea, abdominal pain and recent travel to [place].   Patient refused transport via EMS. Patient hemodynamically, AOx3 with capacity to make decision despite risks. Patient is accompanied by son who emphatically agrees to drive her to [facility] ED immediately after visit.   Will task Social Work to follow up with patient and son after hospital visit for psych/alcohol cessation treatments, emotional support and follow up with Hematology.  Kinjal Garcia MD

## 2023-09-08 NOTE — PHYSICAL EXAM
[Capable of only limited self care, confined to bed or chair more than 50% of waking hours] : Status 3- Capable of only limited self care, confined to bed or chair more than 50% of waking hours [Thin] : thin [Normal] : affect appropriate [de-identified] : not in acute distress [de-identified] : hepatomegaly, normoactive marie [de-identified] : AOx3, intention tremor, Romberg positive, shuffling wide based gait, 4/5 strength in extremities, needs assistance with getting up and walking

## 2023-09-08 NOTE — ED PROVIDER NOTE - CARE PROVIDER_API CALL
Rosemarie Martins  Gastroenterology  [address], [address]  [place] [address]  Phone: [phone]  Fax: [phone]  Follow Up Time: 1-3 Days

## 2023-09-08 NOTE — ED PROVIDER NOTE - NSFOLLOWUPINSTRUCTIONS_ED_ALL_ED_FT
Abdominal Pain    Many things can cause abdominal pain. Many times, abdominal pain is not caused by a disease and will improve without treatment. Your health care provider will do a physical exam to determine if there is a dangerous cause of your pain; blood tests and imaging may help determine the cause of your pain. However, in many cases, no cause may be found and you may need further testing as an outpatient. Monitor your abdominal pain for any changes.     SEEK IMMEDIATE MEDICAL CARE IF YOU HAVE ANY OF THE FOLLOWING SYMPTOMS: worsening abdominal pain, uncontrollable vomiting, profuse diarrhea, inability to have bowel movements or pass gas, black or bloody stools, fever accompanying chest pain or back pain, or fainting. These symptoms may represent a serious problem that is an emergency. Do not wait to see if the symptoms will go away. Get medical help right away. Call 911 and do not drive yourself to the hospital.     Please follow up with your Primary Care Doctor in 1 - 2 days. If you cannot follow-up with your primary care doctor please return to the Emergency Department for any urgent issues.  - Seek immediate medical care for any new, worsening or concerning signs or symptoms.     - If you have difficulty following up, please call: [phone] or go to [email or web address] to obtain a [facility] doctor or specialist who takes your insurance in your area.     Follow up with your primary care physician in 48-72 hours- bring copies of your results.  Return to the emergency department for chest pain, shortness of breath, dizziness, or worsening, concerning, or persistent symptoms.

## 2023-09-08 NOTE — REVIEW OF SYSTEMS
[Fatigue] : fatigue [Recent Change In Weight] : ~T recent weight change [Abdominal Pain] : abdominal pain [Vomiting] : vomiting [Difficulty Walking] : difficulty walking [Negative] : Integumentary [Fever] : no fever [Chills] : no chills [Night Sweats] : no night sweats [Eye Pain] : no eye pain [Red Eyes] : eyes not red [Dry Eyes] : no dryness of the eyes [Vision Problems] : no vision problems [Dysphagia] : no dysphagia [Loss of Hearing] : no loss of hearing [Nosebleeds] : no nosebleeds [Hoarseness] : no hoarseness [Odynophagia] : no odynophagia [Mucosal Pain] : no mucosal pain [Chest Pain] : no chest pain [Palpitations] : no palpitations [Shortness Of Breath] : no shortness of breath [Dysuria] : no dysuria [Dizziness] : no dizziness [Fainting] : no fainting [Easy Bleeding] : no tendency for easy bleeding [Easy Bruising] : no tendency for easy bruising [FreeTextEntry7] : hematemesis, bloody stools, diarrhea, decreased appetite [de-identified] : falls

## 2023-09-08 NOTE — ED PROVIDER NOTE - CLINICAL SUMMARY MEDICAL DECISION MAKING FREE TEXT BOX
61 y/o female hx elevated ferritin/lfts being followed by hem onc sent for egd for continued hematemesis/blood in stool with concern pt may have cirrhosis.HD stable, gets lightheaded frequently. no withdrawal currently. will check labs, gi consult. reassess 61 y/o female hx elevated ferritin/lfts being followed by hem onc sent for egd for continued hematemesis/blood in stool with concern pt may have cirrhosis.HD stable, gets lightheaded frequently. no withdrawal currently. will check labs, gi consult. reassess      gi saw pt, would scope monday. hgb stable. pt doesn't want to stay, states she feels fine. abd benign. enlarged liver. pt care coordinator to schedule gi f/u next week. return precautions. no dizziness. steady gait. no withdrwawal or intox.

## 2023-09-08 NOTE — ED ADULT NURSE NOTE - NSFALLUNIVINTERV_ED_ALL_ED
Bed/Stretcher in lowest position, wheels locked, appropriate side rails in place/Call bell, personal items and telephone in reach/Instruct patient to call for assistance before getting out of bed/chair/stretcher/Non-slip footwear applied when patient is off stretcher/[place] to call system/Physically safe environment - no spills, clutter or unnecessary equipment/Purposeful proactive rounding/Room/bathroom lighting operational, light cord in reach

## 2023-09-08 NOTE — CONSULT NOTE ADULT - ASSESSMENT
61 y/o F with PMHx ETOH abuse, elevated ferritin, hepatic steatosis presented to ED sent in by hematologist with concern for abnormal labs.

## 2023-09-08 NOTE — CONSULT NOTE ADULT - NS ATTEND AMEND GEN_ALL_CORE FT
Patient seen and examined at bedside. She was sent in for abnormal labs. She has had a 30lb weight loss, nausea, vomiting and rectal bleeding for several months. HGB reviewed and normal. She has thrombocytopenia with daily alcohol use. Recommend PPI, alcohol cessation, avoid nsaids, CT A/P and EGD/COLON. Regular diet today, EGD/COLON to be performed on Monday. Son at bedside provided additional history, recommendations reviewed with family.

## 2023-09-08 NOTE — ED ADULT TRIAGE NOTE - CHIEF COMPLAINT QUOTE
Pt A&Ox4, NAD. Pt sent in from hematologist with complaints of vomiting. MD requesting endoscopy to r/o cirrhosis. Breathing even and unlabored.

## 2023-09-08 NOTE — ED PROVIDER NOTE - PATIENT PORTAL LINK FT
You can access the FollowMyHealth Patient Portal offered by [facility] by registering at the following website: [email or web address]. By joining [employer]’s FollowMyHealth portal, you will also be able to view your health information using other applications (apps) compatible with our system.

## 2023-09-08 NOTE — ED ADULT NURSE NOTE - OBJECTIVE STATEMENT
Patient comes into ER A&Ox4, NAD. Patient was sent in from hematologist with complaints of vomiting. MD requesting endoscopy to r/o cirrhosis. Breathing even and unlabored.

## 2023-09-08 NOTE — CONSULT NOTE ADULT - SUBJECTIVE AND OBJECTIVE BOX
Chief Complaint:  Patient is a 62y old  Female who presents with a chief complaint of abnormal labs       Patient is a 62y old  Female who presents with a chief complaint of abnormal labs    HPI: 61 y/o F with PMHx ETOH abuse, elevated ferritin, hepatic steatosis presented to ED sent in by hematologist with concern for abnormal labs. History obtained at bedside from patient and son. She reports she is not eating and has lost 30lbs, is afraid to eat because "I throw up everything." She reports streaks of blood in her vomiting "every time" for several months. She last notes hematemesis yesterday though her son states it was 2 days ago. She also notes dark bloody diarrhea for several months. She does note travel to [facility] in March 2023. She reports multiple falls and weakness. Her son states when she calls him on phone she is sometimes confused. She is currently being worked up by her hematologist for elevated ferritin and thrombocytopenia, concern for hemachromatosis. She reports daily ETOH use, states she drinks 2 glasses of wine, used to drink 1 bottle daily, x 40 years. She denies tobacco or drug use. She has never had an EGD or colonoscopy. She denies family history of liver disease.  She was previously seen at [facility] but left AMA before full evaluation. She does not have a known diagnosis of cirrhosis. Labs remarkable for Hgb 15.1, platelets 102k, INR 1.02, /ALT 51. CT pending. Prior CT A/P from May 2023 shows hepatomegaly, marked steatosis.       PAST MEDICAL & SURGICAL HISTORY:  No pertinent past medical history          REVIEW OF SYSTEMS:   General: Negative  HEENT: Negative  CV: Negative  Respiratory: Negative  GI: See HPI  : Negative  MSK: Negative  Hematologic: Negative  Skin: Negative    MEDICATIONS:   MEDICATIONS  (STANDING):    MEDICATIONS  (PRN):          DIET:          ALLERGIES:   Allergies    penicillin (Other)    Intolerances        Substance Use:   (  ) never used  (  ) other:   Tobacco Usage:  (  x ) never smoked   (   ) former smoker   (   ) current smoker  (     ) pack year  (        ) last cigarette date  Alcohol Usage: daily ETOH     Family History   IBD (  ) Yes   (  ) No  GI Malignancy (  )  Yes    ( x ) No    Health Management  Last Colonoscopy: N/A  Last Endoscopy:  N/A     VITAL SIGNS:   Vital Signs Last 24 Hrs  T(C): 36.6 (08 Sep 2023 10:34), Max: 36.6 (08 Sep 2023 10:34)  T(F): 97.9 (08 Sep 2023 10:34), Max: 97.9 (08 Sep 2023 10:34)  HR: 84 (08 Sep 2023 10:34) (84 - 84)  BP: 145/96 (08 Sep 2023 10:34) (145/96 - 145/96)  BP(mean): --  RR: 20 (08 Sep 2023 10:34) (20 - 20)  SpO2: 96% (08 Sep 2023 10:34) (96% - 96%)    Parameters below as of 08 Sep 2023 10:34  Patient On (Oxygen Delivery Method): room air      I&O's Summary      PHYSICAL EXAM:   GENERAL:  No acute distress  HEENT:  NC/AT, conjunctiva clear, sclera anicteric  CHEST:  No increased effort  HEART:  Regular rate  ABDOMEN:  Soft, non-tender, non-distended, normoactive bowel sounds, no rebound or guarding  EXTREMITIES: No edema  SKIN:  Warm, dry  NEURO:  Calm, cooperative    LABS:                        15.1   3.28  )-----------( 102      ( 08 Sep 2023 11:54 )             42.1         PT/INR - ( 08 Sep 2023 11:54 )   PT: 11.3 sec;   INR: 1.02 ratio         PTT - ( 08 Sep 2023 11:54 )  PTT:30.6 sec      Comprehensive Metabolic Panel (09.08.23 @ 11:54)    Sodium: 139 mmol/L   Potassium: 4.6: Hemolyzed. Interpret with caution mmol/L   Chloride: 98: Chloride reference range changed from ..10/26/2022 mmol/L   Carbon Dioxide: 23.0 mmol/L   Anion Gap: 19 mmol/L   Blood Urea Nitrogen: 4.9 mg/dL   Creatinine: 0.39 mg/dL   Glucose: 76 mg/dL   Calcium: 8.9 mg/dL   Protein Total: 7.8 g/dL   Albumin: 4.4 g/dL   Bilirubin Total: 1.1 mg/dL   Alkaline Phosphatase: 111 U/L   Aspartate Aminotransferase (AST/SGOT): 195: Hemolyzed. Interpret with caution U/L   Alanine Aminotransferase (ALT/SGPT): 51 U/L   eGFR: 112: The estimated glomerular filtration rate (eGFR) is calculated using the  2021 CKD-EPI creatinine equation, which does not have a coefficient for  race and is validated in individuals 18 years of age and older (N Engl J  Med 2021; 385:0375-2838). Creatinine-based eGFR may be inaccurate in  various situations including but not limited to extremes of muscle mass,  altered dietary protein intake, or medications that affect renal tubular  creatinine secretion. mL/min/1.73m2                  RADIOLOGY & ADDITIONAL STUDIES:      < from: CT Abdomen and Pelvis w/ Oral Cont and w/ IV Cont (05.08.23 @ 18:22) >    ACC: [id number] EXAM:  CT ABDOMEN AND PELVIS OC IC   ORDERED BY: GERRI LOVE     PROCEDURE DATE:  05/08/2023          INTERPRETATION:  CLINICAL INFORMATION: Nausea and vomiting. Loose stool.   Upper abdominal pain.    COMPARISON: Abdominal ultrasound March 30, 2021.    CONTRAST/COMPLICATIONS:  IV Contrast: Isovue 370  90 cc administered   10 cc discarded  Oral Contrast: Gastroview  Complications: None reported at time of study completion    PROCEDURE:  CT of the Abdomen and Pelvis was performed.  Sagittal and coronal reformats were performed.    FINDINGS:  LOWER CHEST: Within normal limits.    LIVER: Hepatomegaly, right lobe spanning up to 21 cm. Marked steatosis.  BILE DUCTS: Normal caliber.  GALLBLADDER: Within normal limits.  SPLEEN: Within normal limits.  PANCREAS: Within normal limits.  ADRENALS: Within normal limits.  KIDNEYS/URETERS: No hydronephrosis or calculi. Subcentimeter right renal   cortical hypodensity too small to characterize.    BLADDER: Within normal limits.  REPRODUCTIVE ORGANS: Uterus and adnexa within normal limits.    BOWEL: No bowel obstruction. Appendix is normal.  PERITONEUM: No ascites.  VESSELS: Within normal limits.  RETROPERITONEUM/LYMPH NODES: No lymphadenopathy.  ABDOMINAL WALL: Within normal limits.  BONES: Within normal limits.    IMPRESSION:  No bowel obstruction.  Enlarged liver with marked steatosis.        --- End of Report ---            ADINA ZACARIAS MD; Attending Radiologist  This document has been electronically signed. May  8 [id number]    < end of copied text >

## 2023-09-08 NOTE — CONSULT NOTE ADULT - PROBLEM SELECTOR RECOMMENDATION 9
Active ETOH abuse, elevated ferritin, hepatic steatosis, 30lb wt loss. Reported hematemesis and bloody dark stools.   CT A/P w/ Oral Cont and w/ IV Cont (05.08.23 @ 18:22)- Hepatomegaly, marked steatosis.    - Trend CBC, transfuse as needed. Monitor for signs of bleeding. Avoid NSAIDs  - Trend renal function, LFTs, electrolytes, coags daily  - IV PPI BID for GI mucosal cytoprotection  - CT A/P pending; NPO for now   - MVI, thiamine and folic acid. Optimize nutrition (when able) Alcohol cessation counseling. CIWA protocol.   - Will plan for EGD/colonoscopy on Monday 9/11/23   _________________________________________________________________  Assessment and recommendations are final when note is signed by the attending physician.

## 2023-09-08 NOTE — ED PROVIDER NOTE - OBJECTIVE STATEMENT
63 y/o female hx etoh abuse (daily 2 wine per night), elevated ferritin/low platelets and elevated lft's following with hem/onc sent by hem onc for egd per pt/son. has had hematemesis and dark blood in stool since may, no acute change. has had dizziness and falls intermittently over that time. Hem onc concerned pt risk of cirrhosis/pud and wants egd now per family. denies cp/sob/abd pain. no othre complaints. no a/c.

## 2023-09-08 NOTE — HISTORY OF PRESENT ILLNESS
[de-identified] : 63 yo F with PMHx of alcohol use disorder (40+ years) w/o history of withdrawal symptoms, hepatic steatosis, hyperferritinemia, thrombocytopenia presented to office for urgent evaluation. Patient called office stating he was having multiple episodes of hematemesis, bloody diarrhea and poor PO intake for several weeks.   Patient was previously seen at hematology office for thrombocytopenia, elevated ferritin level and neutropenia. Hemochromatosis mutation sent, heterozygous for one, normal in other. At that time, was previously admitted to [facility] for dehydration and recurrent falls. Was previously hospitalized at [facility] but left AMA before full evaluation. On last visit, ferritin 7000s, WBC 2.6, Hgb 15.  Patient's son present. Patient then admits to long alcohol history of 40+ years, hx of drinking 1 bottle of wine a day for years, now down to approx 2 glasses of wine/day. Endorses intermittent darrell hematemesis and bloody diarrhea, worst episode 2 days ago. Was spitting up blood last night, none today. Multiple episodes of falling at work and home without LOC or pre-syncope. Falling was so bad, was barred from returning from work due to high fall risk. States symptoms started 2 months ago, after going to son's wedding in [place]. Also endorses 30 lbs weight loss, poor PO intake, abdominal pain. Denies fever, SOB, chest pain.

## 2023-09-10 NOTE — PHYSICAL EXAM
[Fully active, able to carry on all pre-disease performance without restriction] : Status 0 - Fully active, able to carry on all pre-disease performance without restriction [Normal] : affect appropriate [de-identified] : anicteric [de-identified] : RRR, normal S1S2 [de-identified] : no edema [de-identified] : no rash [de-identified] : A & O x 3

## 2023-09-10 NOTE — HISTORY OF PRESENT ILLNESS
[de-identified] : Mrs. Martinez is a 63 yo WF referred by Dr. Griffith for thrombocytopenia, elevated ferritin level and neutropenia. Juan Minor was in her usual state of health until after her son's wedding when she began to experience diarrhea. After a few weeks she finally went to the ER at [facility], on May 8th, 2023, where she received IV hydration for dehydration. Lab work in the hospital showed a WBC count of 2.9, Platelet count of 80,000, ANC-1.95, MCV-106, AST 94. A CT of the Abdomen revealed hepatomegaly withe steatosis. She was seen by her primary care MD on 5-10-23 and platelet count was 103,000, WBC-4.17, MCV-102.8, ferritin 1421. Her AST and ALT have been intermittently elevated since March of 2021.She has been checked for Hepatitis and HIV and these were negative. [de-identified] : She is feeling much better, no further diarrhea. She denies any evidence of GI or  bleeding or bruising.. no signs of infection.  08/25/23: The patient describes intermittent vomiting, diarrhea but no abdominal pain. She describes hand tremors and imbalance. She denies fever, chills, dizziness, fainting.

## 2023-09-10 NOTE — REVIEW OF SYSTEMS
[Diarrhea: Grade 0] : Diarrhea: Grade 0 [Joint Pain] : joint pain [Muscle Weakness] : muscle weakness [Fever] : no fever [Chills] : no chills [Vision Problems] : no vision problems [Chest Pain] : no chest pain [Mucosal Pain] : no mucosal pain [Lower Ext Edema] : no lower extremity edema [Shortness Of Breath] : no shortness of breath [Cough] : no cough [Abdominal Pain] : no abdominal pain [Muscle Pain] : no muscle pain [Dizziness] : no dizziness [Fainting] : no fainting [Easy Bleeding] : no tendency for easy bleeding [Easy Bruising] : no tendency for easy bruising [FreeTextEntry7] : intermittent vomiting and diarrhea  [de-identified] : occasional rash on legs [de-identified] : + hand tremors, imbalance; no paresthesia

## 2023-09-10 NOTE — ASSESSMENT
[FreeTextEntry1] : Mrs. Martinez is a 63 yo WF referred by Dr. Griffith for thrombocytopenia, elevated ferritin level and neutropenia. Juan Minor was in her usual state of health until after her son's wedding when she began to experience diarrhea. After a few weeks she finally went to the ER at [facility], on May 8th, 2023, where she received IV hydration for dehydration. Lab work in the hospital showed a WBC count of 2.9, Platelet count of 80,000, ANC-1.95, MCV-106, AST 94. A CT of the Abdomen revealed hepatomegaly withe steatosis. She was seen by her primary care MD on 5-10-23 and platelet count was 103,000, WBC-4.17, MCV-102.8, ferritin 1421. Her AST and ALT have been intermittently elevated since March of 2021.She has been checked for Hepatitis and HIV and these were negative.  Today her CBC shows WBC-4.0, HGb-15.8, HCT-43.4, Plat-167,000, MCV-97, ANC- 1.6. I am sending off repeat CMP, iron studies and a flow cytometry, to R/O a hematologic cause of neutropenia. Platelet count is now normal.  Elevated ferritin could have been elevated in response to acute illness. If still elevated, will bring her back in to do a Hemochromatosis panel. Sending off an NINA and Sed rate as a screen for an autoimmune process. I will notify her of results and plan. May need to see a Hepatologist  08/28/23: 1) Elevated ferritin level- hepatic steatosis on prior CT scan Plan- Repeat CMP, iron studies on 8/28/23 Check Hemochromatosis gene mutation on 8/28  2) Leukopenia- peripheral blood flow cytometry done on 6/5/23- no abnormalities documented.  Plan-  Monitor CBC with differential- check labs on 8/28, not done today. Patient requests to come back.   RTC in 1 month

## 2023-09-10 NOTE — CONSULT LETTER
[Dear  ___] : Dear  [unfilled], [Consult Letter:] : I had the pleasure of evaluating your patient, [unfilled]. [FreeTextEntry2] : Dr. Griffith [FreeTextEntry1] : Please find attached our consultation on your patient, Ms. ANNA MARIE LEMA . \par  We take a multidisciplinary team approach to patient care and consider you, the referring physician, an extension of our team. \par  It is our commitment to provide your patient with the highest quality of advanced therapeutic options. We thank you for allowing us to participate in the care of your patient.\par  Please do not hesitate to contact our team with any questions or concerns at [phone].\par  \par  \par

## 2023-09-10 NOTE — REASON FOR VISIT
[Follow-Up Visit] : a follow-up visit for [Family Member] : family member [FreeTextEntry2] : elevated ferritin level

## 2023-09-12 ENCOUNTER — INPATIENT (INPATIENT)
Facility: HOSPITAL | Age: 63
LOS: 0 days | Discharge: HOME | DRG: 379 | End: 2023-09-13
Attending: STUDENT IN AN ORGANIZED HEALTH CARE EDUCATION/TRAINING PROGRAM | Admitting: STUDENT IN AN ORGANIZED HEALTH CARE EDUCATION/TRAINING PROGRAM
Payer: COMMERCIAL

## 2023-09-12 VITALS
WEIGHT: 117.51 LBS | DIASTOLIC BLOOD PRESSURE: 87 MMHG | RESPIRATION RATE: 16 BRPM | SYSTOLIC BLOOD PRESSURE: 126 MMHG | OXYGEN SATURATION: 96 % | HEART RATE: 95 BPM | TEMPERATURE: 98 F | HEIGHT: 65 IN

## 2023-09-12 DIAGNOSIS — K92.2 GASTROINTESTINAL HEMORRHAGE, UNSPECIFIED: ICD-10-CM

## 2023-09-12 PROBLEM — Z14.8 HEMOCHROMATOSIS CARRIER: Status: ACTIVE | Noted: 2023-09-12

## 2023-09-12 LAB
ALBUMIN SERPL ELPH-MCNC: 4.1 G/DL — SIGNIFICANT CHANGE UP (ref 3.3–5.2)
ALBUMIN SERPL ELPH-MCNC: 4.4 G/DL — SIGNIFICANT CHANGE UP (ref 3.3–5.2)
ALP SERPL-CCNC: 102 U/L — SIGNIFICANT CHANGE UP (ref 40–120)
ALP SERPL-CCNC: 95 U/L — SIGNIFICANT CHANGE UP (ref 40–120)
ALT FLD-CCNC: 50 U/L — HIGH
ALT FLD-CCNC: 53 U/L — HIGH
ANION GAP SERPL CALC-SCNC: 12 MMOL/L — SIGNIFICANT CHANGE UP (ref 5–17)
ANION GAP SERPL CALC-SCNC: 14 MMOL/L — SIGNIFICANT CHANGE UP (ref 5–17)
APTT BLD: 30 SEC — SIGNIFICANT CHANGE UP (ref 24.5–35.6)
AST SERPL-CCNC: 161 U/L — HIGH
AST SERPL-CCNC: 163 U/L — HIGH
BASOPHILS # BLD AUTO: 0.04 K/UL — SIGNIFICANT CHANGE UP (ref 0–0.2)
BASOPHILS NFR BLD AUTO: 1.2 % — SIGNIFICANT CHANGE UP (ref 0–2)
BILIRUB DIRECT SERPL-MCNC: 0.6 MG/DL — HIGH (ref 0–0.3)
BILIRUB INDIRECT FLD-MCNC: 0.6 MG/DL — SIGNIFICANT CHANGE UP (ref 0.2–1)
BILIRUB SERPL-MCNC: 1.2 MG/DL — SIGNIFICANT CHANGE UP (ref 0.4–2)
BILIRUB SERPL-MCNC: 1.4 MG/DL — SIGNIFICANT CHANGE UP (ref 0.4–2)
BLD GP AB SCN SERPL QL: SIGNIFICANT CHANGE UP
BUN SERPL-MCNC: 4.3 MG/DL — LOW (ref 8–20)
BUN SERPL-MCNC: 4.5 MG/DL — LOW (ref 8–20)
CALCIUM SERPL-MCNC: 9.2 MG/DL — SIGNIFICANT CHANGE UP (ref 8.4–10.5)
CALCIUM SERPL-MCNC: 9.5 MG/DL — SIGNIFICANT CHANGE UP (ref 8.4–10.5)
CHLORIDE SERPL-SCNC: 97 MMOL/L — SIGNIFICANT CHANGE UP (ref 96–108)
CHLORIDE SERPL-SCNC: 97 MMOL/L — SIGNIFICANT CHANGE UP (ref 96–108)
CO2 SERPL-SCNC: 27 MMOL/L — SIGNIFICANT CHANGE UP (ref 22–29)
CO2 SERPL-SCNC: 27 MMOL/L — SIGNIFICANT CHANGE UP (ref 22–29)
CREAT SERPL-MCNC: 0.5 MG/DL — SIGNIFICANT CHANGE UP (ref 0.5–1.3)
CREAT SERPL-MCNC: 0.6 MG/DL — SIGNIFICANT CHANGE UP (ref 0.5–1.3)
EGFR: 101 ML/MIN/1.73M2 — SIGNIFICANT CHANGE UP
EGFR: 105 ML/MIN/1.73M2 — SIGNIFICANT CHANGE UP
EOSINOPHIL # BLD AUTO: 0.05 K/UL — SIGNIFICANT CHANGE UP (ref 0–0.5)
EOSINOPHIL NFR BLD AUTO: 1.5 % — SIGNIFICANT CHANGE UP (ref 0–6)
GLUCOSE SERPL-MCNC: 131 MG/DL — HIGH (ref 70–99)
GLUCOSE SERPL-MCNC: 99 MG/DL — SIGNIFICANT CHANGE UP (ref 70–99)
HCT VFR BLD CALC: 40.3 % — SIGNIFICANT CHANGE UP (ref 34.5–45)
HGB BLD-MCNC: 14.2 G/DL — SIGNIFICANT CHANGE UP (ref 11.5–15.5)
IMM GRANULOCYTES NFR BLD AUTO: 0.3 % — SIGNIFICANT CHANGE UP (ref 0–0.9)
INR BLD: 1.05 RATIO — SIGNIFICANT CHANGE UP (ref 0.85–1.18)
LYMPHOCYTES # BLD AUTO: 0.43 K/UL — LOW (ref 1–3.3)
LYMPHOCYTES # BLD AUTO: 12.6 % — LOW (ref 13–44)
MAGNESIUM SERPL-MCNC: 1.9 MG/DL — SIGNIFICANT CHANGE UP (ref 1.6–2.6)
MAGNESIUM SERPL-MCNC: 1.9 MG/DL — SIGNIFICANT CHANGE UP (ref 1.8–2.6)
MCHC RBC-ENTMCNC: 35.2 GM/DL — SIGNIFICANT CHANGE UP (ref 32–36)
MCHC RBC-ENTMCNC: 36.7 PG — HIGH (ref 27–34)
MCV RBC AUTO: 104.1 FL — HIGH (ref 80–100)
MONOCYTES # BLD AUTO: 0.4 K/UL — SIGNIFICANT CHANGE UP (ref 0–0.9)
MONOCYTES NFR BLD AUTO: 11.7 % — SIGNIFICANT CHANGE UP (ref 2–14)
NEUTROPHILS # BLD AUTO: 2.49 K/UL — SIGNIFICANT CHANGE UP (ref 1.8–7.4)
NEUTROPHILS NFR BLD AUTO: 72.7 % — SIGNIFICANT CHANGE UP (ref 43–77)
OB PNL STL: NEGATIVE — SIGNIFICANT CHANGE UP
PHOSPHATE SERPL-MCNC: 3.5 MG/DL — SIGNIFICANT CHANGE UP (ref 2.4–4.7)
PLATELET # BLD AUTO: 127 K/UL — LOW (ref 150–400)
POTASSIUM SERPL-MCNC: 3.2 MMOL/L — LOW (ref 3.5–5.3)
POTASSIUM SERPL-MCNC: 3.9 MMOL/L — SIGNIFICANT CHANGE UP (ref 3.5–5.3)
POTASSIUM SERPL-SCNC: 3.2 MMOL/L — LOW (ref 3.5–5.3)
POTASSIUM SERPL-SCNC: 3.9 MMOL/L — SIGNIFICANT CHANGE UP (ref 3.5–5.3)
PROT SERPL-MCNC: 6.9 G/DL — SIGNIFICANT CHANGE UP (ref 6.6–8.7)
PROT SERPL-MCNC: 7.6 G/DL — SIGNIFICANT CHANGE UP (ref 6.6–8.7)
PROTHROM AB SERPL-ACNC: 11.6 SEC — SIGNIFICANT CHANGE UP (ref 9.5–13)
RBC # BLD: 3.87 M/UL — SIGNIFICANT CHANGE UP (ref 3.8–5.2)
RBC # FLD: 13.5 % — SIGNIFICANT CHANGE UP (ref 10.3–14.5)
SODIUM SERPL-SCNC: 136 MMOL/L — SIGNIFICANT CHANGE UP (ref 135–145)
SODIUM SERPL-SCNC: 138 MMOL/L — SIGNIFICANT CHANGE UP (ref 135–145)
WBC # BLD: 3.42 K/UL — LOW (ref 3.8–10.5)
WBC # FLD AUTO: 3.42 K/UL — LOW (ref 3.8–10.5)

## 2023-09-12 PROCEDURE — 99223 1ST HOSP IP/OBS HIGH 75: CPT

## 2023-09-12 PROCEDURE — 99285 EMERGENCY DEPT VISIT HI MDM: CPT

## 2023-09-12 RX ORDER — POTASSIUM CHLORIDE 20 MEQ
40 PACKET (EA) ORAL EVERY 4 HOURS
Refills: 0 | Status: COMPLETED | OUTPATIENT
Start: 2023-09-12 | End: 2023-09-12

## 2023-09-12 RX ORDER — ONDANSETRON 8 MG/1
4 TABLET, FILM COATED ORAL EVERY 8 HOURS
Refills: 0 | Status: DISCONTINUED | OUTPATIENT
Start: 2023-09-12 | End: 2023-09-13

## 2023-09-12 RX ORDER — FOLIC ACID 0.8 MG
1 TABLET ORAL DAILY
Refills: 0 | Status: DISCONTINUED | OUTPATIENT
Start: 2023-09-12 | End: 2023-09-13

## 2023-09-12 RX ORDER — LANOLIN ALCOHOL/MO/W.PET/CERES
3 CREAM (GRAM) TOPICAL AT BEDTIME
Refills: 0 | Status: DISCONTINUED | OUTPATIENT
Start: 2023-09-12 | End: 2023-09-13

## 2023-09-12 RX ORDER — PANTOPRAZOLE SODIUM 20 MG/1
40 TABLET, DELAYED RELEASE ORAL EVERY 12 HOURS
Refills: 0 | Status: DISCONTINUED | OUTPATIENT
Start: 2023-09-12 | End: 2023-09-13

## 2023-09-12 RX ORDER — PANTOPRAZOLE SODIUM 20 MG/1
8 TABLET, DELAYED RELEASE ORAL
Qty: 80 | Refills: 0 | Status: DISCONTINUED | OUTPATIENT
Start: 2023-09-12 | End: 2023-09-12

## 2023-09-12 RX ORDER — THIAMINE MONONITRATE (VIT B1) 100 MG
100 TABLET ORAL DAILY
Refills: 0 | Status: DISCONTINUED | OUTPATIENT
Start: 2023-09-12 | End: 2023-09-13

## 2023-09-12 RX ORDER — POTASSIUM CHLORIDE 20 MEQ
40 PACKET (EA) ORAL DAILY
Refills: 0 | Status: DISCONTINUED | OUTPATIENT
Start: 2023-09-12 | End: 2023-09-12

## 2023-09-12 RX ORDER — PANTOPRAZOLE SODIUM 20 MG/1
80 TABLET, DELAYED RELEASE ORAL ONCE
Refills: 0 | Status: DISCONTINUED | OUTPATIENT
Start: 2023-09-12 | End: 2023-09-12

## 2023-09-12 RX ORDER — ACETAMINOPHEN 500 MG
650 TABLET ORAL EVERY 6 HOURS
Refills: 0 | Status: DISCONTINUED | OUTPATIENT
Start: 2023-09-12 | End: 2023-09-13

## 2023-09-12 RX ORDER — SOD SULF/SODIUM/NAHCO3/KCL/PEG
4000 SOLUTION, RECONSTITUTED, ORAL ORAL ONCE
Refills: 0 | Status: COMPLETED | OUTPATIENT
Start: 2023-09-12 | End: 2023-09-12

## 2023-09-12 RX ADMIN — Medication 100 MILLIGRAM(S): at 17:50

## 2023-09-12 RX ADMIN — Medication 4000 MILLILITER(S): at 20:15

## 2023-09-12 RX ADMIN — PANTOPRAZOLE SODIUM 40 MILLIGRAM(S): 20 TABLET, DELAYED RELEASE ORAL at 17:40

## 2023-09-12 RX ADMIN — Medication 1 TABLET(S): at 13:31

## 2023-09-12 RX ADMIN — Medication 40 MILLIEQUIVALENT(S): at 17:42

## 2023-09-12 RX ADMIN — Medication 40 MILLIEQUIVALENT(S): at 12:41

## 2023-09-12 RX ADMIN — Medication 1 MILLIGRAM(S): at 13:31

## 2023-09-12 NOTE — ED PROVIDER NOTE - OBJECTIVE STATEMENT
61 y/o F with PMHx ETOH abuse, hepatic steatosis returning to the ED for evaluation of GI bleeding. She has had hematemesis and dark blood in stool since may, there is associated weight loss, dizziness, and falls. Her hematologist noted elevated ferritin and thrombocytopenia. Patient was evaluated by GI in the ED 4 days ago, plan is for an EGD. CT abd/pelvis at that time showed enlarged liver with steatosis. Patient feeling well at this time, denies fever, cough, congestion, N/V/D, abd pain, back pain, urinary symptoms. Has had minimal bleeding in the last 4 days.

## 2023-09-12 NOTE — SBIRT NOTE ADULT - NSSBIRTALCPASSREFTXDET_GEN_A_CORE
Screening results were   reviewed with the patient and patient was provided information about healthy guidelines and potential negative   consequences associated with level of risk. Motivation and readiness to reduce or stop use was discussed and   goals and activities to make changes were suggested/offered

## 2023-09-12 NOTE — ED ADULT NURSE NOTE - CHIEF COMPLAINT QUOTE
I was told to come to be admitted for an endoscopy/colonoscopy by Dr. Martins (GI). I was sent from [facility] on Friday to [facility] for a workup

## 2023-09-12 NOTE — ED PROVIDER NOTE - IV ALTEPLASE INCLUSION HIDDEN
A follow up visit was made to the patient. Emotional support, spiritual presence and   prayer were provided for the patient. He was alert and oriented. He was appreciative of the 's prayer.       JAY Savage show

## 2023-09-12 NOTE — ED ADULT NURSE NOTE - NSFALLRISKINTERV_ED_ALL_ED

## 2023-09-12 NOTE — H&P ADULT - NSHPPHYSICALEXAM_GEN_ALL_CORE
Vital Signs Last 24 Hrs  T(F): 98.4 (12 Sep 2023 07:46), Max: 98.4 (12 Sep 2023 07:46)  HR: 95 (12 Sep 2023 07:46) (95 - 95)  BP: 126/87 (12 Sep 2023 07:46) (126/87 - 126/87)  RR: 16 (12 Sep 2023 07:46) (16 - 16)  SpO2: 96% (12 Sep 2023 07:46) (96% - 96%)    Physical Exam:  Constitutional: Appears stated aged, not- chronically ill-appearing, laying comfortably in bed in NAD  HEENT: NC/AT, PERRL, EOMI, trachea midline, no JVD  Respiratory: CTA bilaterally, symmetrical chest rise  Cardiovascular: RRR, no m/g/r  Gastrointestinal: Soft, NT/ND, BS+  Vascular: 2+ peripheral pulses  Neurological: A/O x 3, no focal neurological deficits  Psych: Fair mood/affect  Musculoskeletal: No edema, cyanosis, deformities. ROM normal  Skin: No obvious rash, lesions. No jaundice.

## 2023-09-12 NOTE — CONSULT NOTE ADULT - SUBJECTIVE AND OBJECTIVE BOX
Chief Complaint:  Patient is a 62y old  Female who presents with a chief complaint of evaluation     Patient is a 62y old  Female who presents with a chief complaint of evaluation     HPI: 63 y/o F with PMHx ETOH abuse, elevated ferritin, hepatic steatosis presented to ED for an evaluation of hematemesis and dark stool. She was previously seen in the ED 4 days ago and planned for EGD/colonoscopy but left. She now re-presents and is agreeable for procedure. She reports no hematemesis or bloody stools for past several days. She is not eating much. No labs available from today.     She was initially sent in by hematologist with concern for abnormal labs. History obtained at bedside from patient and son. She reports she is not eating and has lost 30lbs, is afraid to eat because "I throw up everything." She reports streaks of blood in her vomiting "every time" for several months. She last notes hematemesis yesterday though her son states it was 2 days ago. She also notes dark bloody diarrhea for several months. She does note travel to [facility] in March 2023. She reports multiple falls and weakness. Her son states when she calls him on phone she is sometimes confused. She is currently being worked up by her hematologist for elevated ferritin and thrombocytopenia, concern for hemachromatosis. She reports daily ETOH use, states she drinks 2 glasses of wine, used to drink 1 bottle daily, x 40 years. She denies tobacco or drug use. She has never had an EGD or colonoscopy. She denies family history of liver disease.  She was previously seen at [facility] but left AMA before full evaluation. She does not have a known diagnosis of cirrhosis. Labs remarkable for Hgb 15.1, platelets 102k, INR 1.02, /ALT 51. Prior CT A/P from May 2023 shows hepatomegaly, marked steatosis.         PAST MEDICAL & SURGICAL HISTORY:      REVIEW OF SYSTEMS:   General: Negative  HEENT: Negative  CV: Negative  Respiratory: Negative  GI: See HPI  : Negative  MSK: Negative  Hematologic: Negative  Skin: Negative    MEDICATIONS:   MEDICATIONS  (STANDING):    MEDICATIONS  (PRN):          DIET:          ALLERGIES:   Allergies    penicillin (Other)    Intolerances        Substance Use:   (  ) never used  (  ) other:  Tobacco Usage:  (   ) never smoked   (   ) former smoker   (   ) current smoker  (     ) pack year  (        ) last cigarette date  Alcohol Usage:    Family History   IBD (  ) Yes   (  ) No  GI Malignancy (  )  Yes    (  ) No    Health Management  Last Colonoscopy:  Last Endoscopy:     VITAL SIGNS:   Vital Signs Last 24 Hrs  T(C): 36.9 (12 Sep 2023 07:46), Max: 36.9 (12 Sep 2023 07:46)  T(F): 98.4 (12 Sep 2023 07:46), Max: 98.4 (12 Sep 2023 07:46)  HR: 95 (12 Sep 2023 07:46) (95 - 95)  BP: 126/87 (12 Sep 2023 07:46) (126/87 - 126/87)  BP(mean): --  RR: 16 (12 Sep 2023 07:46) (16 - 16)  SpO2: 96% (12 Sep 2023 07:46) (96% - 96%)    Parameters below as of 12 Sep 2023 07:46  Patient On (Oxygen Delivery Method): room air      I&O's Summary      PHYSICAL EXAM:   GENERAL:  No acute distress  HEENT:  NC/AT, conjunctiva clear, sclera anicteric  CHEST:  No increased effort  HEART:  Regular rate  ABDOMEN:  Soft, non-tender, non-distended, normoactive bowel sounds, no rebound or guarding  EXTREMITIES: No edema  SKIN:  Warm, dry  NEURO:  Calm, cooperative    LABS:                                                      RADIOLOGY & ADDITIONAL STUDIES:    < from: CT Abdomen and Pelvis w/ IV Cont (09.08.23 @ 18:03) >    ACC: [id number] EXAM:  CT ABDOMEN AND PELVIS IC   ORDERED BY: DAWNA BOTELLO     PROCEDURE DATE:  09/08/2023          INTERPRETATION:  CLINICAL INFORMATION: Abdominal pain.  Hematemesis. Abnormal liver function tests.    COMPARISON: 5/8/2023    CONTRAST/COMPLICATIONS:  IV Contrast: Omnipaque 350  90 cc administered   10 cc discarded  Oral Contrast: NONE  Complications: None reported at time of study completion    PROCEDURE:  CT of the Abdomen and Pelvis was performed.  Sagittal and coronal reformats were performed.    FINDINGS:  LOWER CHEST: Small right middle lobe nodule and nodule in the right major   fissure, likely small intrapulmonary lymph nodes.    LIVER: Enlarged liver with severe fatty infiltration. Right hepatic lobe   measures 22.5 cm in craniocaudal diameter. No overt surface nodularity or   morphologic changes of cirrhosis.  BILE DUCTS: Normal caliber.  GALLBLADDER: Within normal limits.  SPLEEN: Within normal limits.  PANCREAS: Within normal limits.  ADRENALS: Within normal limits.  KIDNEYS/URETERS: Subcentimeter right lower lobe renal lesion too small to   characterize, likely a cyst.    BLADDER: Within normal limits.  REPRODUCTIVE ORGANS: Uterus and bilateral adnexa within normal limits.    BOWEL: No bowel obstruction. Appendix is not visualized.  PERITONEUM: No ascites.  VESSELS: Within normal limits.  RETROPERITONEUM/LYMPH NODES: No lymphadenopathy.  ABDOMINAL WALL: Within normal limits.  BONES: Degenerative changes.    IMPRESSION:  Enlarged, fatty liver.        --- End of Report ---            HALIMA SMITH MD; Attending Radiologist  This document has been electronically signed. Sep  8 2023  6:27PM    < end of copied text >       Patient is a 62y old  Female who presents with a chief complaint of inability to tolerate PO incl most recently liquids    HPI: 61 y/o F with PMHx ETOH abuse, elevated ferritin, hepatic steatosis presented to ED for an evaluation of hematemesis and dark stool. She was previously seen in the ED 4 days ago and planned for EGD/colonoscopy but left. She now re-presents and is agreeable for procedure. She reports no hematemesis or bloody stools for past several days. She is not eating much. No labs available from today.     She was initially sent in by hematologist with concern for abnormal labs. History obtained at bedside from patient and son. She reports she is not eating and has lost 30lbs, is afraid to eat because "I throw up everything." She reports streaks of blood in her vomiting "every time" for several months. She last notes hematemesis yesterday though her son states it was 2 days ago. She also notes dark bloody diarrhea for several months. She does note travel to [facility] in March 2023. She reports multiple falls and weakness. Her son states when she calls him on phone she is sometimes confused. She is currently being worked up by her hematologist for elevated ferritin and thrombocytopenia, concern for hemachromatosis. She reports daily ETOH use, states she drinks 2 glasses of wine, used to drink 1 bottle daily, x 40 years. She denies tobacco or drug use. She has never had an EGD or colonoscopy. She denies family history of liver disease.  She was previously seen at [facility] but left AMA before full evaluation. She does not have a known diagnosis of cirrhosis. Labs remarkable for Hgb 15.1, platelets 102k, INR 1.02, /ALT 51. Prior CT A/P from May 2023 shows hepatomegaly, marked steatosis.         PAST MEDICAL & SURGICAL HISTORY:      REVIEW OF SYSTEMS:   General: Negative  HEENT: Negative  CV: Negative  Respiratory: Negative  GI: See HPI  : Negative  MSK: Negative  Hematologic: Negative  Skin: Negative    MEDICATIONS:   MEDICATIONS  (STANDING):    MEDICATIONS  (PRN):          DIET:          ALLERGIES:   Allergies    penicillin (Other)    Intolerances        Substance Use:   (  ) never used  (  ) other:  Tobacco Usage:  (   ) never smoked   (   ) former smoker   (   ) current smoker  (     ) pack year  (        ) last cigarette date  Alcohol Usage:    Family History   IBD (  ) Yes   (  ) No  GI Malignancy (  )  Yes    (  ) No    Health Management  Last Colonoscopy:  Last Endoscopy:     VITAL SIGNS:   Vital Signs Last 24 Hrs  T(C): 36.9 (12 Sep 2023 07:46), Max: 36.9 (12 Sep 2023 07:46)  T(F): 98.4 (12 Sep 2023 07:46), Max: 98.4 (12 Sep 2023 07:46)  HR: 95 (12 Sep 2023 07:46) (95 - 95)  BP: 126/87 (12 Sep 2023 07:46) (126/87 - 126/87)  BP(mean): --  RR: 16 (12 Sep 2023 07:46) (16 - 16)  SpO2: 96% (12 Sep 2023 07:46) (96% - 96%)    Parameters below as of 12 Sep 2023 07:46  Patient On (Oxygen Delivery Method): room air      I&O's Summary      PHYSICAL EXAM:   GENERAL:  No acute distress  HEENT:  NC/AT, conjunctiva clear, sclera anicteric  CHEST:  No increased effort  HEART:  Regular rate  ABDOMEN:  Soft, non-tender, non-distended, normoactive bowel sounds, no rebound or guarding  EXTREMITIES: No edema  SKIN:  Warm, dry  NEURO:  Calm, cooperative    LABS:                                                      RADIOLOGY & ADDITIONAL STUDIES:    < from: CT Abdomen and Pelvis w/ IV Cont (09.08.23 @ 18:03) >    ACC: [id number] EXAM:  CT ABDOMEN AND PELVIS IC   ORDERED BY: DAWNA BOTELLO     PROCEDURE DATE:  09/08/2023          INTERPRETATION:  CLINICAL INFORMATION: Abdominal pain.  Hematemesis. Abnormal liver function tests.    COMPARISON: 5/8/2023    CONTRAST/COMPLICATIONS:  IV Contrast: Omnipaque 350  90 cc administered   10 cc discarded  Oral Contrast: NONE  Complications: None reported at time of study completion    PROCEDURE:  CT of the Abdomen and Pelvis was performed.  Sagittal and coronal reformats were performed.    FINDINGS:  LOWER CHEST: Small right middle lobe nodule and nodule in the right major   fissure, likely small intrapulmonary lymph nodes.    LIVER: Enlarged liver with severe fatty infiltration. Right hepatic lobe   measures 22.5 cm in craniocaudal diameter. No overt surface nodularity or   morphologic changes of cirrhosis.  BILE DUCTS: Normal caliber.  GALLBLADDER: Within normal limits.  SPLEEN: Within normal limits.  PANCREAS: Within normal limits.  ADRENALS: Within normal limits.  KIDNEYS/URETERS: Subcentimeter right lower lobe renal lesion too small to   characterize, likely a cyst.    BLADDER: Within normal limits.  REPRODUCTIVE ORGANS: Uterus and bilateral adnexa within normal limits.    BOWEL: No bowel obstruction. Appendix is not visualized.  PERITONEUM: No ascites.  VESSELS: Within normal limits.  RETROPERITONEUM/LYMPH NODES: No lymphadenopathy.  ABDOMINAL WALL: Within normal limits.  BONES: Degenerative changes.    IMPRESSION:  Enlarged, fatty liver.        --- End of Report ---            HALIMA SMITH MD; Attending Radiologist  This document has been electronically signed. Sep  8 2023  6:27PM    < end of copied text >

## 2023-09-12 NOTE — ED ADULT TRIAGE NOTE - CHIEF COMPLAINT QUOTE
I was told to come to be admitted for an endoscopy/colonoscopy by Dr. Martins (GI). I was sent from [facility] on Friday to [facility] for a workup

## 2023-09-12 NOTE — H&P ADULT - HISTORY OF PRESENT ILLNESS
Patient is a 61 yo F w/ PMH of ETOH abuse, Elevated Ferritin, Heptatic Steatosi presenting to the ED for hematemsis. Patient was initially seen in the ED on 9/8 for intermittent episodes of hematesis for the past month. She states the last episode was one week ago. She was evaluated by GI and recommended a GI/EGD however she signed out AMA. She states she has not been eating due to nausea/vomiting and has lost significant weight. She reports daily ETOH use of about 5-6 glasses of wine a day with no history of withdrawal. She is currently being worked up for elevated ferritin and thrombocytopenia by Hematology. She states she was hospitilizaed at [facility] recently and was being worked up for frequent falls but signed out AMA. Patient denies fever, chills, chest pain, palpitations, orthopnea, PND, SOB, cough, wheezing, abdominal pain, nausea, vomiting, diarrhea, constipation, bloody bowel movements, melena, urinary complaints, syncope, calf tenderness, paresthesias.

## 2023-09-12 NOTE — CONSULT NOTE ADULT - NS ATTEND AMEND GEN_ALL_CORE FT
61 y/o F with PMHx ETOH abuse, elevated ferritin, hepatic steatosis presented to ED for evaluation of hematemesis and dark stool. Also 30 lb weight loss on 4 mo and more recently inability to tolerate any PO. She seems odd and might benefit also from a psych consult   Meanwhile, agree with PA plan to do EGD and colon tomorrow to eval for inability to tolerate PO and colon cancer screening with FTT.   NPO after midnight, prep now. Clear diet in the meanwhile.

## 2023-09-12 NOTE — ED PROVIDER NOTE - ATTENDING CONTRIBUTION TO CARE
The patient discussed with resident    Abdominal Pain    I, Dread Nichole, performed the initial face to face bedside interview with this patient regarding history of present illness, review of symptoms and relevant past medical, social and family history.  I completed an independent physical examination.  I was the initial provider who evaluated this patient. I have signed out the follow up of any pending tests (i.e. labs, radiological studies) to the resident.  I have communicated the patient’s plan of care and disposition with the resident

## 2023-09-12 NOTE — CONSULT NOTE ADULT - PROBLEM SELECTOR RECOMMENDATION 9
Active ETOH abuse, elevated ferritin, hepatic steatosis, 30lb wt loss. Reported hematemesis and bloody dark stools.   CT A/P w/ IV Cont (09.08.23)- Enlarged, fatty liver.    - Trend CBC, transfuse as needed. Monitor for signs of bleeding. Avoid NSAIDs  - Trend renal function, LFTs, electrolytes, coags daily  - IV PPI BID for GI mucosal cytoprotection  - Check GI PCR, O&P, stool culture for bloody stools/ recent travel; please collect stool PRIOR to colon prep   - MVI, thiamine and folic acid. Optimize nutrition (when able), Alcohol cessation counseling. CIWA protocol.   - Will plan for EGD/colonoscopy tomorrow 9/13/23, colonoscopy prep ordered, goal bowel movements clear and  - Clear liquid diet today. NPO after midnight  - Maintain current T&S, INR <1.5, Hgb >7.0, Plt >50 prior to procedure  _________________________________________________________________  Assessment and recommendations are final when note is signed by the attending physician.

## 2023-09-12 NOTE — ED ADULT NURSE NOTE - CAS TRG GEN SKIN COLOR
TRANSFER - OUT REPORT:     Verbal report given to: (at bedside). Report consisted of patient's Situation, Background, Assessment and   Recommendations(SBAR). Opportunity for questions and clarification was provided. Patient transported with a Registered Nurse. Patient transported to: recovery. Normal for race

## 2023-09-12 NOTE — ED ADULT NURSE NOTE - OBJECTIVE STATEMENT
Pt reports abdominal discomfort with intermittent bloody emesis and stool x3 months. PT denies any blood thinners or any prescribed or OTC meds. Pt not tolorating PO x 2 weeks, has lost 30 lbs in 3 months. Pt was seen here on Friday was dx with fatty liver, pt cut back on drinking etoh, last drink was on Saturday night. Pt was told to return to ED today to be admitted for endo/colonoscopy work up. Abd is firm and non-tender, pt has not been passing gas "for months" last BM was Saturday and it was normal.

## 2023-09-12 NOTE — H&P ADULT - ASSESSMENT
Patient is a 61 yo F w/ PMH of ETOH abuse, Elevated Ferritin, Heptatic Steatosi presenting to the ED for hematemsis. Patient was initially seen in the ED on 9/8 for intermittent episodes of hematemesis for the past month.    UGIB  - FOBT negative  - H/H: 14.2/40.3  - GI consult appreciated  - IV PPI BID  - Avoid NSAIDs  - Monitor CBC, transfuse PRN  - CLD  - NPO at midnight for EGD/Colonoscopy tomorrow    Hx of ETOH abuse  - Last drink on 9/8, no signs of withdrawal. Continue to monitor  - MVT/Folate/Thiamine    Hypokalemia  - Repletion ordered  - Monitor BMP, correct electrolytes as needed    Thrombocytopenia  - likely in setting of ETOH use    Transaminitis  - in setting of ETOH abuse  - monitor    Frequent falls  - Denies LOC during episodes  - Fall precautions  - PT eval    DVT ppx: SCDs in setting of anemia

## 2023-09-12 NOTE — ED PROVIDER NOTE - PHYSICAL EXAMINATION
General: Awake, alert, lying in bed in NAD  HEENT: Normocephalic, atraumatic. No scleral icterus or conjunctival injection. EOMI. Moist mucous membranes. Oropharynx clear.   Neck:. Soft and supple.  Cardiac: RRR, Peripheral pulses 2+ and symmetric. No LE edema.  Resp: Lungs CTAB. No accessory muscle use  Abd: Soft, non-tender, non-distended. No guarding, rebound, or rigidity.  Back: Spine midline and non-tender.   Skin: No rashes, abrasions, or lacerations.  Neuro: AO x 4. Moves all extremities symmetrically. Motor strength and sensation grossly intact.  Psych: Appropriate mood and affect General: Awake, alert, lying in bed in NAD  HEENT: Normocephalic, atraumatic. No scleral icterus or conjunctival injection. EOMI. Moist mucous membranes. Oropharynx clear.   Neck:. Soft and supple.  Cardiac: RRR, Peripheral pulses 2+ and symmetric. No LE edema.  Resp: Lungs CTAB. No accessory muscle use  Abd: Soft, non-tender, non-distended. No guarding, rebound, or rigidity.  : rectal exam with brown stool, no active bleeding.   Back: Spine midline and non-tender.   Skin: No rashes, abrasions, or lacerations.  Neuro: AO x 4. Moves all extremities symmetrically. Motor strength and sensation grossly intact.  Psych: Appropriate mood and affect

## 2023-09-12 NOTE — CONSULT NOTE ADULT - ASSESSMENT
61 y/o F with PMHx ETOH abuse, elevated ferritin, hepatic steatosis presented to ED for evaluation of hematemesis and dark stool

## 2023-09-12 NOTE — ED PROVIDER NOTE - CLINICAL SUMMARY MEDICAL DECISION MAKING FREE TEXT BOX
63 y/o F with PMHx ETOH abuse, hepatic steatosis returning to the ED for evaluation of GI bleeding. She has had hematemesis and dark blood in stool since may, there is associated weight loss, dizziness, and falls. Her hematologist noted elevated ferritin and thrombocytopenia. Patient was evaluated by GI in the ED 4 days ago, plan is for an EGD. CT abd/pelvis at that time showed enlarged liver with steatosis. Patient feeling well at this time, denies fever, cough, congestion, N/V/D, abd pain, back pain, urinary symptoms. Has had minimal bleeding in the last 4 days. 61 y/o F with PMHx ETOH abuse, hepatic steatosis returning to the ED for evaluation of GI bleeding. She has had hematemesis and dark blood in stool since may, there is associated weight loss, dizziness, and falls. Her hematologist noted elevated ferritin and thrombocytopenia. Patient was evaluated by GI in the ED 4 days ago, plan is for an EGD. Patient feeling well at this time, denies fever, cough, congestion, N/V/D, abd pain, back pain, urinary symptoms. Has had minimal bleeding in the last 4 days. Patient not anemic today, she does have hypokalemia. Patient given protonix and potassium in ED. stool occult negative today. GI consulted, recommend EGD and colonoscopy. Dr. Felix to admit.

## 2023-09-13 ENCOUNTER — TRANSCRIPTION ENCOUNTER (OUTPATIENT)
Age: 63
End: 2023-09-13

## 2023-09-13 VITALS
OXYGEN SATURATION: 98 % | RESPIRATION RATE: 18 BRPM | HEART RATE: 90 BPM | SYSTOLIC BLOOD PRESSURE: 131 MMHG | TEMPERATURE: 99 F | DIASTOLIC BLOOD PRESSURE: 88 MMHG

## 2023-09-13 LAB
ALBUMIN SERPL ELPH-MCNC: 3.6 G/DL — SIGNIFICANT CHANGE UP (ref 3.3–5.2)
ALP SERPL-CCNC: 78 U/L — SIGNIFICANT CHANGE UP (ref 40–120)
ALT FLD-CCNC: 41 U/L — HIGH
ANION GAP SERPL CALC-SCNC: 17 MMOL/L — SIGNIFICANT CHANGE UP (ref 5–17)
ANISOCYTOSIS BLD QL: SIGNIFICANT CHANGE UP
AST SERPL-CCNC: 114 U/L — HIGH
BASOPHILS # BLD AUTO: 0.03 K/UL — SIGNIFICANT CHANGE UP (ref 0–0.2)
BASOPHILS NFR BLD AUTO: 0.9 % — SIGNIFICANT CHANGE UP (ref 0–2)
BILIRUB SERPL-MCNC: 1 MG/DL — SIGNIFICANT CHANGE UP (ref 0.4–2)
BUN SERPL-MCNC: 5.5 MG/DL — LOW (ref 8–20)
CALCIUM SERPL-MCNC: 8.7 MG/DL — SIGNIFICANT CHANGE UP (ref 8.4–10.5)
CHLORIDE SERPL-SCNC: 102 MMOL/L — SIGNIFICANT CHANGE UP (ref 96–108)
CO2 SERPL-SCNC: 23 MMOL/L — SIGNIFICANT CHANGE UP (ref 22–29)
CREAT SERPL-MCNC: 0.46 MG/DL — LOW (ref 0.5–1.3)
EGFR: 107 ML/MIN/1.73M2 — SIGNIFICANT CHANGE UP
EOSINOPHIL # BLD AUTO: 0.03 K/UL — SIGNIFICANT CHANGE UP (ref 0–0.5)
EOSINOPHIL NFR BLD AUTO: 0.9 % — SIGNIFICANT CHANGE UP (ref 0–6)
GI PCR PANEL: SIGNIFICANT CHANGE UP
GIANT PLATELETS BLD QL SMEAR: PRESENT — SIGNIFICANT CHANGE UP
GLUCOSE SERPL-MCNC: 78 MG/DL — SIGNIFICANT CHANGE UP (ref 70–99)
HCT VFR BLD CALC: 35.4 % — SIGNIFICANT CHANGE UP (ref 34.5–45)
HGB BLD-MCNC: 12.4 G/DL — SIGNIFICANT CHANGE UP (ref 11.5–15.5)
INR BLD: 1.08 RATIO — SIGNIFICANT CHANGE UP (ref 0.85–1.18)
LYMPHOCYTES # BLD AUTO: 0.65 K/UL — LOW (ref 1–3.3)
LYMPHOCYTES # BLD AUTO: 21.2 % — SIGNIFICANT CHANGE UP (ref 13–44)
MACROCYTES BLD QL: SIGNIFICANT CHANGE UP
MAGNESIUM SERPL-MCNC: 1.8 MG/DL — SIGNIFICANT CHANGE UP (ref 1.6–2.6)
MANUAL SMEAR VERIFICATION: SIGNIFICANT CHANGE UP
MCHC RBC-ENTMCNC: 35 GM/DL — SIGNIFICANT CHANGE UP (ref 32–36)
MCHC RBC-ENTMCNC: 37.2 PG — HIGH (ref 27–34)
MCV RBC AUTO: 106.3 FL — HIGH (ref 80–100)
MONOCYTES # BLD AUTO: 0.41 K/UL — SIGNIFICANT CHANGE UP (ref 0–0.9)
MONOCYTES NFR BLD AUTO: 13.3 % — SIGNIFICANT CHANGE UP (ref 2–14)
NEUTROPHILS # BLD AUTO: 1.94 K/UL — SIGNIFICANT CHANGE UP (ref 1.8–7.4)
NEUTROPHILS NFR BLD AUTO: 63.7 % — SIGNIFICANT CHANGE UP (ref 43–77)
PHOSPHATE SERPL-MCNC: 3.2 MG/DL — SIGNIFICANT CHANGE UP (ref 2.4–4.7)
PLAT MORPH BLD: NORMAL — SIGNIFICANT CHANGE UP
PLATELET # BLD AUTO: 108 K/UL — LOW (ref 150–400)
POTASSIUM SERPL-MCNC: 4 MMOL/L — SIGNIFICANT CHANGE UP (ref 3.5–5.3)
POTASSIUM SERPL-SCNC: 4 MMOL/L — SIGNIFICANT CHANGE UP (ref 3.5–5.3)
PROT SERPL-MCNC: 6.2 G/DL — LOW (ref 6.6–8.7)
PROTHROM AB SERPL-ACNC: 12 SEC — SIGNIFICANT CHANGE UP (ref 9.5–13)
RBC # BLD: 3.33 M/UL — LOW (ref 3.8–5.2)
RBC # FLD: 13.5 % — SIGNIFICANT CHANGE UP (ref 10.3–14.5)
RBC BLD AUTO: ABNORMAL
SMUDGE CELLS # BLD: PRESENT — SIGNIFICANT CHANGE UP
SODIUM SERPL-SCNC: 141 MMOL/L — SIGNIFICANT CHANGE UP (ref 135–145)
WBC # BLD: 3.05 K/UL — LOW (ref 3.8–10.5)
WBC # FLD AUTO: 3.05 K/UL — LOW (ref 3.8–10.5)

## 2023-09-13 PROCEDURE — 80053 COMPREHEN METABOLIC PANEL: CPT

## 2023-09-13 PROCEDURE — 86901 BLOOD TYPING SEROLOGIC RH(D): CPT

## 2023-09-13 PROCEDURE — 86900 BLOOD TYPING SEROLOGIC ABO: CPT

## 2023-09-13 PROCEDURE — 85610 PROTHROMBIN TIME: CPT

## 2023-09-13 PROCEDURE — 87177 OVA AND PARASITES SMEARS: CPT

## 2023-09-13 PROCEDURE — 86850 RBC ANTIBODY SCREEN: CPT

## 2023-09-13 PROCEDURE — 85730 THROMBOPLASTIN TIME PARTIAL: CPT

## 2023-09-13 PROCEDURE — 87507 IADNA-DNA/RNA PROBE TQ 12-25: CPT

## 2023-09-13 PROCEDURE — 84100 ASSAY OF PHOSPHORUS: CPT

## 2023-09-13 PROCEDURE — 99285 EMERGENCY DEPT VISIT HI MDM: CPT | Mod: 25

## 2023-09-13 PROCEDURE — 36415 COLL VENOUS BLD VENIPUNCTURE: CPT

## 2023-09-13 PROCEDURE — 85025 COMPLETE CBC W/AUTO DIFF WBC: CPT

## 2023-09-13 PROCEDURE — 87046 STOOL CULTR AEROBIC BACT EA: CPT

## 2023-09-13 PROCEDURE — 87045 FECES CULTURE AEROBIC BACT: CPT

## 2023-09-13 PROCEDURE — 87077 CULTURE AEROBIC IDENTIFY: CPT

## 2023-09-13 PROCEDURE — 82272 OCCULT BLD FECES 1-3 TESTS: CPT

## 2023-09-13 PROCEDURE — 99239 HOSP IP/OBS DSCHRG MGMT >30: CPT | Mod: GC

## 2023-09-13 PROCEDURE — 80048 BASIC METABOLIC PNL TOTAL CA: CPT

## 2023-09-13 PROCEDURE — 80076 HEPATIC FUNCTION PANEL: CPT

## 2023-09-13 PROCEDURE — 83735 ASSAY OF MAGNESIUM: CPT

## 2023-09-13 DEVICE — NAIL OSTEO 1.5X16MM STRL: Type: IMPLANTABLE DEVICE | Status: FUNCTIONAL

## 2023-09-13 RX ADMIN — PANTOPRAZOLE SODIUM 40 MILLIGRAM(S): 20 TABLET, DELAYED RELEASE ORAL at 05:53

## 2023-09-13 NOTE — DISCHARGE NOTE NURSING/CASE MANAGEMENT/SOCIAL WORK - NSDCPEFALRISK_GEN_ALL_CORE
For information on Fall & Injury Prevention, visit: [email or web address] OR  [email or web address] OR  https://www.cdc.gov/steadi/patient.html

## 2023-09-13 NOTE — PHYSICAL THERAPY INITIAL EVALUATION ADULT - PLANNED THERAPY INTERVENTIONS, PT EVAL
balance training/bed mobility training/gait training/joint mobilization/ROM/stretching/transfer training

## 2023-09-13 NOTE — DISCHARGE NOTE PROVIDER - NSDCFUSCHEDAPPT_GEN_ALL_CORE_FT
Taina Navas  [facility]  [address]  Scheduled Appointment: 10/02/2023

## 2023-09-13 NOTE — DISCHARGE NOTE NURSING/CASE MANAGEMENT/SOCIAL WORK - PATIENT PORTAL LINK FT
You can access the FollowMyHealth Patient Portal offered by [facility] by registering at the following website: [email or web address]. By joining [facility]’s FollowMyHealth portal, you will also be able to view your health information using other applications (apps) compatible with our system.

## 2023-09-13 NOTE — PROGRESS NOTE ADULT - ASSESSMENT
Patient is a 63 yo F w/ PMH of ETOH abuse, Elevated Ferritin, Heptatic Steatosi presenting to the ED for hematemsis. Patient was initially seen in the ED on 9/8 for intermittent episodes of hematemesis for the past month.    #UGIB  - FOBT negative  - H/H: 14.2/40.3  - GI consult appreciated  - IV PPI BID  - Avoid NSAIDs  - Monitor CBC, transfuse PRN  - CLD  - NPO at midnight for EGD/Colonoscopy tomorrow    #Hx of ETOH abuse  - Last drink on 9/8, no signs of withdrawal. Continue to monitor  - MVT/Folate/Thiamine    #Hypokalemia  - Repletion ordered  - Monitor BMP, correct electrolytes as needed    #Thrombocytopenia  - likely in setting of ETOH use    #Transaminitis  - in setting of ETOH abuse  - monitor    #Frequent falls  - Denies LOC during episodes  - Fall precautions  - PT eval    DVT ppx: SCDs in setting of anemia

## 2023-09-13 NOTE — DISCHARGE NOTE PROVIDER - NSDCCPCAREPLAN_GEN_ALL_CORE_FT
PRINCIPAL DISCHARGE DIAGNOSIS  Diagnosis: GIB (gastrointestinal bleeding)  Assessment and Plan of Treatment: No evidence of GI bleed

## 2023-09-13 NOTE — DISCHARGE NOTE PROVIDER - HOSPITAL COURSE
Patient is a 61 yo F w/ PMH of ETOH abuse, Elevated Ferritin, Hepatic Steatosis, presented to the ED for hematemesis. Patient was initially seen in the ED on 9/8 for intermittent episodes of hematesis for the past month. She stated that the last episode was one week ago. She was evaluated by GI and recommended a GI/EGD however she signed out AMA. She stated that she has not been eating due to nausea/vomiting and has lost significant weight. She reported daily ETOH use of about 5-6 glasses of wine a day with no history of withdrawal. She is currently being worked up for elevated ferritin and thrombocytopenia by Hematology. She stated that she was hospitalized at [facility] recently and was being worked up for frequent falls but signed out AMA. Patient denies fever, chills, chest pain, palpitations, orthopnea, PND, SOB, cough, wheezing, abdominal pain, nausea, vomiting, diarrhea, constipation, bloody bowel movements, melena, urinary complaints, syncope, calf tenderness, paresthesias. Patient was sent for colonoscopy and upper endoscopy to evaluate. GI confirmed that both colonoscopy and EGD were completely normal. There is no evidence of hematemesis or any gastrointestinal bleeding especially in the setting of a stable hemoglobin (15).  They did not feel that any further GI evaluation is in order. Patient is stable for discharge home and encouraged to follow up with outpatient PT.

## 2023-09-13 NOTE — DISCHARGE NOTE PROVIDER - NSDCCAREPROVSEEN_GEN_ALL_CORE_FT
"Toy Fox is a 69 year old male who presents for:  Chief Complaint   Patient presents with     RECHECK     Hi carotid (8:30 [facility]; 9:15 [facility]) History of Left carotid endarterectomy in 07/22/08; right carotid stenosis; 3 year follow up to 2/18/15 appointment with Dr. Leeanna Pinzons:    Vitals:    05/17/18 0903   BP: 132/81   BP Location: Left arm   Patient Position: Chair   Cuff Size: Adult Large   Pulse: 71       BMI:  Estimated body mass index is 25.25 kg/(m^2) as calculated from the following:    Height as of 12/21/11: 6' 3\" (1.905 m).    Weight as of 12/21/11: 202 lb (91.6 kg).    Pain Score:  Data Unavailable      Do you feel safe in your environment?  Yes      Rita Green"
Deep, Chandra Wu

## 2023-09-13 NOTE — PHYSICAL THERAPY INITIAL EVALUATION ADULT - ADDITIONAL COMMENTS
per patient she lives alone, has ~3STE with a handrail. Pt has been using a cane and now works mostly from home due to falls. Pt states she was told that she has footdrop on the right however never followed up with a doctors appointment to determine the cause.

## 2023-09-13 NOTE — PROGRESS NOTE ADULT - SUBJECTIVE AND OBJECTIVE BOX
Colonoscopy and upper endoscopy completed and both are completely normal as expected.  There is no evidence that this lady had hematemesis or in fact any gastrointestinal bleeding especially given her hemoglobin of 15.  No further GI evaluation is in order.  We will sign off.
Patient is a 61 yo F w/ PMH of ETOH abuse, Elevated Ferritin, Heptatic Steatosi presenting to the ED for hematemsis. Patient was initially seen in the ED on 9/8 for intermittent episodes of hematesis for the past month. She states the last episode was one week ago. She was evaluated by GI and recommended a GI/EGD however she signed out AMA. She states she has not been eating due to nausea/vomiting and has lost significant weight. She reports daily ETOH use of about 5-6 glasses of wine a day with no history of withdrawal. She is currently being worked up for elevated ferritin and thrombocytopenia by Hematology. She states she was hospitilizaed at [facility] recently and was being worked up for frequent falls but signed out AMA. Patient denies fever, chills, chest pain, palpitations, orthopnea, PND, SOB, cough, wheezing, abdominal pain, nausea, vomiting, diarrhea, constipation, bloody bowel movements, melena, urinary complaints, syncope, calf tenderness, paresthesias    No acute events overnight. This morning patient laying comfortably in bed, waiting to for EGD/colonsocopy at 9:30.     ROS:    MEDICATIONS  (STANDING):  folic acid 1 milliGRAM(s) Oral daily  multivitamin 1 Tablet(s) Oral daily  pantoprazole  Injectable 40 milliGRAM(s) IV Push every 12 hours  thiamine 100 milliGRAM(s) Oral daily    MEDICATIONS  (PRN):  acetaminophen     Tablet .. 650 milliGRAM(s) Oral every 6 hours PRN Temp greater or equal to 38C (100.4F), Mild Pain (1 - 3)  aluminum hydroxide/magnesium hydroxide/simethicone Suspension 30 milliLiter(s) Oral every 4 hours PRN Dyspepsia  LORazepam   Injectable 2 milliGRAM(s) IV Push every 1 hour PRN CIWA-Ar score 8 or greater  melatonin 3 milliGRAM(s) Oral at bedtime PRN Insomnia  ondansetron Injectable 4 milliGRAM(s) IV Push every 8 hours PRN Nausea and/or Vomiting      Allergies    penicillin (Other)    Intolerances          Vital Signs Last 24 Hrs  T(C): 37.1 (13 Sep 2023 09:52), Max: 37.1 (13 Sep 2023 09:52)  T(F): 98.7 (13 Sep 2023 09:52), Max: 98.7 (13 Sep 2023 09:52)  HR: 90 (13 Sep 2023 09:52) (68 - 90)  BP: 131/88 (13 Sep 2023 09:52) (127/80 - 135/91)  BP(mean): --  RR: 18 (13 Sep 2023 09:52) (18 - 18)  SpO2: 98% (13 Sep 2023 09:52) (97% - 99%)    Parameters below as of 13 Sep 2023 09:52  Patient On (Oxygen Delivery Method): room air        PHYSICAL EXAM:      LABS:                        12.4   3.05  )-----------( 108      ( 13 Sep 2023 06:55 )             35.4     09-13    141  |  102  |  5.5<L>  ----------------------------<  78  4.0   |  23.0  |  0.46<L>    Ca    8.7      13 Sep 2023 06:55  Phos  3.2     09-13  Mg     1.8     09-13    TPro  6.2<L>  /  Alb  3.6  /  TBili  1.0  /  DBili  x   /  AST  114<H>  /  ALT  41<H>  /  AlkPhos  78  09-13    PT/INR - ( 13 Sep 2023 06:55 )   PT: 12.0 sec;   INR: 1.08 ratio         PTT - ( 12 Sep 2023 10:00 )  PTT:30.0 sec  Urinalysis Basic - ( 13 Sep 2023 06:55 )    Color: x / Appearance: x / SG: x / pH: x  Gluc: 78 mg/dL / Ketone: x  / Bili: x / Urobili: x   Blood: x / Protein: x / Nitrite: x   Leuk Esterase: x / RBC: x / WBC x   Sq Epi: x / Non Sq Epi: x / Bacteria: x      CAPILLARY BLOOD GLUCOSE          RADIOLOGY & ADDITIONAL TESTS:      Imaging Personally Reviewed:  [  ] YES  [  ] NO    Consultant(s) Notes Reviewed:  [  ] YES  [  ] NO    Care Discussed with Consultants/Other Providers [  ] YES  [  ] NO    Plan of Care discussed with Housestaff [ X ]YES [  ] NO
denies pain/discomfort

## 2023-09-13 NOTE — DISCHARGE NOTE PROVIDER - CARE PROVIDER_API CALL
Taina Navas  NP in Family Health  [address], [address]  [place] [address]  Phone: [phone]  Fax: [phone]  Established Patient  Follow Up Time:

## 2023-09-14 LAB
CULTURE RESULTS: SIGNIFICANT CHANGE UP
SPECIMEN SOURCE: SIGNIFICANT CHANGE UP

## 2023-09-15 LAB
CULTURE RESULTS: SIGNIFICANT CHANGE UP
SPECIMEN SOURCE: SIGNIFICANT CHANGE UP

## 2023-09-19 ENCOUNTER — NON-APPOINTMENT (OUTPATIENT)
Age: 63
End: 2023-09-19

## 2023-09-20 PROBLEM — Z86.2 PERSONAL HISTORY OF DISEASES OF THE BLOOD AND BLOOD-FORMING ORGANS AND CERTAIN DISORDERS INVOLVING THE IMMUNE MECHANISM: Chronic | Status: ACTIVE | Noted: 2023-09-12

## 2023-09-20 PROBLEM — F10.10 ALCOHOL ABUSE, UNCOMPLICATED: Chronic | Status: ACTIVE | Noted: 2023-09-12

## 2023-09-26 ENCOUNTER — APPOINTMENT (OUTPATIENT)
Dept: FAMILY MEDICINE | Facility: CLINIC | Age: 63
End: 2023-09-26
Payer: COMMERCIAL

## 2023-09-26 VITALS
HEIGHT: 64 IN | BODY MASS INDEX: 19.81 KG/M2 | RESPIRATION RATE: 16 BRPM | HEART RATE: 75 BPM | TEMPERATURE: 97.8 F | OXYGEN SATURATION: 98 % | DIASTOLIC BLOOD PRESSURE: 60 MMHG | SYSTOLIC BLOOD PRESSURE: 100 MMHG | WEIGHT: 116 LBS

## 2023-09-26 DIAGNOSIS — K92.0 HEMATEMESIS: ICD-10-CM

## 2023-09-26 DIAGNOSIS — F10.11 ALCOHOL ABUSE, IN REMISSION: ICD-10-CM

## 2023-09-26 DIAGNOSIS — Z09 ENCOUNTER FOR FOLLOW-UP EXAMINATION AFTER COMPLETED TREATMENT FOR CONDITIONS OTHER THAN MALIGNANT NEOPLASM: ICD-10-CM

## 2023-09-26 DIAGNOSIS — R26.81 UNSTEADINESS ON FEET: ICD-10-CM

## 2023-09-26 PROCEDURE — 99495 TRANSJ CARE MGMT MOD F2F 14D: CPT

## 2023-09-29 ENCOUNTER — APPOINTMENT (OUTPATIENT)
Dept: GASTROENTEROLOGY | Facility: CLINIC | Age: 63
End: 2023-09-29

## 2023-10-04 ENCOUNTER — NON-APPOINTMENT (OUTPATIENT)
Age: 63
End: 2023-10-04

## 2023-10-06 ENCOUNTER — OUTPATIENT (OUTPATIENT)
Dept: OUTPATIENT SERVICES | Facility: HOSPITAL | Age: 63
LOS: 1 days | End: 2023-10-06
Payer: COMMERCIAL

## 2023-10-06 ENCOUNTER — APPOINTMENT (OUTPATIENT)
Dept: MRI IMAGING | Facility: CLINIC | Age: 63
End: 2023-10-06
Payer: COMMERCIAL

## 2023-10-06 DIAGNOSIS — Z00.8 ENCOUNTER FOR OTHER GENERAL EXAMINATION: ICD-10-CM

## 2023-10-06 DIAGNOSIS — M21.371 FOOT DROP, RIGHT FOOT: ICD-10-CM

## 2023-10-06 PROCEDURE — 72149 MRI LUMBAR SPINE W/DYE: CPT

## 2023-10-06 PROCEDURE — A9585: CPT

## 2023-10-06 PROCEDURE — 72149 MRI LUMBAR SPINE W/DYE: CPT | Mod: 26

## 2023-10-12 ENCOUNTER — APPOINTMENT (OUTPATIENT)
Dept: ORTHOPEDIC SURGERY | Facility: CLINIC | Age: 63
End: 2023-10-12
Payer: COMMERCIAL

## 2023-10-12 VITALS
WEIGHT: 116 LBS | BODY MASS INDEX: 19.81 KG/M2 | HEART RATE: 89 BPM | DIASTOLIC BLOOD PRESSURE: 69 MMHG | SYSTOLIC BLOOD PRESSURE: 102 MMHG | HEIGHT: 64 IN

## 2023-10-12 DIAGNOSIS — M79.646 PAIN IN UNSPECIFIED FINGER(S): ICD-10-CM

## 2023-10-12 PROCEDURE — 73140 X-RAY EXAM OF FINGER(S): CPT | Mod: RT

## 2023-10-12 PROCEDURE — 99204 OFFICE O/P NEW MOD 45 MIN: CPT | Mod: 25

## 2023-10-12 PROCEDURE — 29130 APPL FINGER SPLINT STATIC: CPT | Mod: RT

## 2023-10-14 ENCOUNTER — APPOINTMENT (OUTPATIENT)
Dept: MRI IMAGING | Facility: CLINIC | Age: 63
End: 2023-10-14

## 2023-11-10 ENCOUNTER — APPOINTMENT (OUTPATIENT)
Dept: ORTHOPEDIC SURGERY | Facility: CLINIC | Age: 63
End: 2023-11-10
Payer: COMMERCIAL

## 2023-11-10 VITALS
DIASTOLIC BLOOD PRESSURE: 71 MMHG | TEMPERATURE: 97.9 F | BODY MASS INDEX: 19.63 KG/M2 | HEART RATE: 78 BPM | WEIGHT: 115 LBS | HEIGHT: 64 IN | SYSTOLIC BLOOD PRESSURE: 109 MMHG

## 2023-11-10 DIAGNOSIS — M21.371 FOOT DROP, RIGHT FOOT: ICD-10-CM

## 2023-11-10 DIAGNOSIS — M51.9 UNSPECIFIED THORACIC, THORACOLUMBAR AND LUMBOSACRAL INTERVERTEBRAL DISC DISORDER: ICD-10-CM

## 2023-11-10 PROCEDURE — 99204 OFFICE O/P NEW MOD 45 MIN: CPT

## 2023-11-10 PROCEDURE — 72100 X-RAY EXAM L-S SPINE 2/3 VWS: CPT

## 2023-11-17 ENCOUNTER — APPOINTMENT (OUTPATIENT)
Dept: ORTHOPEDIC SURGERY | Facility: CLINIC | Age: 63
End: 2023-11-17
Payer: COMMERCIAL

## 2023-11-17 VITALS
WEIGHT: 115 LBS | DIASTOLIC BLOOD PRESSURE: 60 MMHG | HEART RATE: 78 BPM | SYSTOLIC BLOOD PRESSURE: 88 MMHG | HEIGHT: 64 IN | BODY MASS INDEX: 19.63 KG/M2

## 2023-11-17 DIAGNOSIS — S62.609D FRACTURE OF UNSPECIFIED PHALANX OF UNSPECIFIED FINGER, SUBSEQUENT ENCOUNTER FOR FRACTURE WITH ROUTINE HEALING: ICD-10-CM

## 2023-11-17 PROCEDURE — 73140 X-RAY EXAM OF FINGER(S): CPT

## 2023-11-17 PROCEDURE — 99214 OFFICE O/P EST MOD 30 MIN: CPT | Mod: 25

## 2023-11-27 ENCOUNTER — APPOINTMENT (OUTPATIENT)
Dept: ORTHOPEDIC SURGERY | Facility: AMBULATORY SURGERY CENTER | Age: 63
End: 2023-11-27
Payer: COMMERCIAL

## 2023-11-27 PROCEDURE — 26123 RELEASE PALM CONTRACTURE: CPT | Mod: F4

## 2023-12-07 ENCOUNTER — APPOINTMENT (OUTPATIENT)
Dept: ORTHOPEDIC SURGERY | Facility: CLINIC | Age: 63
End: 2023-12-07
Payer: COMMERCIAL

## 2023-12-07 DIAGNOSIS — M65.30 TRIGGER FINGER, UNSPECIFIED FINGER: ICD-10-CM

## 2023-12-07 PROCEDURE — 20600 DRAIN/INJ JOINT/BURSA W/O US: CPT | Mod: 79,F5

## 2023-12-07 PROCEDURE — 99213 OFFICE O/P EST LOW 20 MIN: CPT | Mod: 24,25

## 2024-01-26 ENCOUNTER — APPOINTMENT (OUTPATIENT)
Dept: OBGYN | Facility: CLINIC | Age: 64
End: 2024-01-26
Payer: COMMERCIAL

## 2024-01-26 VITALS
SYSTOLIC BLOOD PRESSURE: 102 MMHG | DIASTOLIC BLOOD PRESSURE: 62 MMHG | HEIGHT: 64 IN | WEIGHT: 116 LBS | BODY MASS INDEX: 19.81 KG/M2

## 2024-01-26 DIAGNOSIS — N90.5 ATROPHY OF VULVA: ICD-10-CM

## 2024-01-26 DIAGNOSIS — N95.2 POSTMENOPAUSAL ATROPHIC VAGINITIS: ICD-10-CM

## 2024-01-26 PROCEDURE — 99396 PREV VISIT EST AGE 40-64: CPT

## 2024-01-26 NOTE — DISCUSSION/SUMMARY
[FreeTextEntry1] : 63-year-old -0-0-1 presents for routine GYN evaluation.  Physical exam shows atrophic changes.  Pap GC chlamydia sent and bone density ordered.  Return in 1 year for follow-up.

## 2024-01-26 NOTE — HISTORY OF PRESENT ILLNESS
[N] : Patient is not sexually active [Y] : Positive pregnancy history [Menarche Age: ____] : age at menarche was [unfilled] [Menopause Age: ____] : age at menopause was [unfilled] [PGxTotal] : 1 [FreeTextEntry1] : 63-year-old -0-0-1 presents for GYN evaluation.  Denies pain bleeding or discharge. [[facility]] : 1 [PGHxPremature] : 0 [PGHxAbortions] : 0 [PGHxABInduced] : 0 [[facility] Phoenixiving] : 1 [PGHxABSpont] : 0 [PGHxEctopic] : 0 [PGHxMultBirths] : 0

## 2024-01-26 NOTE — PHYSICAL EXAM
[Chaperone Present] : A chaperone was present in the examining room during all aspects of the physical examination [FreeTextEntry1] : nakita [Appropriately responsive] : appropriately responsive [Alert] : alert [No Acute Distress] : no acute distress [No Lymphadenopathy] : no lymphadenopathy [Regular Rate Rhythm] : regular rate rhythm [No Murmurs] : no murmurs [Clear to Auscultation B/L] : clear to auscultation bilaterally [Soft] : soft [Non-tender] : non-tender [Non-distended] : non-distended [No HSM] : No HSM [No Lesions] : no lesions [No Mass] : no mass [Oriented x3] : oriented x3 [Examination Of The Breasts] : a normal appearance [No Masses] : no breast masses were palpable [Vulvar Atrophy] : vulvar atrophy [Labia Majora] : normal [Labia Minora] : normal [Cystocele] : a cystocele [Atrophy] : atrophy [Dry Mucosa] : dry mucosa [Rectocele] : a rectocele [Normal] : normal [Uterine Adnexae] : normal

## 2024-01-29 LAB
C TRACH RRNA SPEC QL NAA+PROBE: NOT DETECTED
N GONORRHOEA RRNA SPEC QL NAA+PROBE: NOT DETECTED
SOURCE TP AMPLIFICATION: NORMAL

## 2024-01-30 LAB — HPV HIGH+LOW RISK DNA PNL CVX: NOT DETECTED

## 2024-01-31 LAB — CYTOLOGY CVX/VAG DOC THIN PREP: NORMAL

## 2024-02-22 ENCOUNTER — OFFICE (OUTPATIENT)
Dept: URBAN - METROPOLITAN AREA CLINIC 94 | Facility: CLINIC | Age: 64
Setting detail: OPHTHALMOLOGY
End: 2024-02-22
Payer: COMMERCIAL

## 2024-02-22 DIAGNOSIS — H43.813: ICD-10-CM

## 2024-02-22 DIAGNOSIS — H04.123: ICD-10-CM

## 2024-02-22 DIAGNOSIS — H43.393: ICD-10-CM

## 2024-02-22 DIAGNOSIS — H01.005: ICD-10-CM

## 2024-02-22 DIAGNOSIS — H25.11: ICD-10-CM

## 2024-02-22 DIAGNOSIS — H25.13: ICD-10-CM

## 2024-02-22 DIAGNOSIS — H01.002: ICD-10-CM

## 2024-02-22 PROCEDURE — 92136 OPHTHALMIC BIOMETRY: CPT | Mod: TC | Performed by: OPHTHALMOLOGY

## 2024-02-22 PROCEDURE — 92250 FUNDUS PHOTOGRAPHY W/I&R: CPT | Performed by: OPHTHALMOLOGY

## 2024-02-22 PROCEDURE — 92136 OPHTHALMIC BIOMETRY: CPT | Performed by: OPHTHALMOLOGY

## 2024-02-22 PROCEDURE — 99214 OFFICE O/P EST MOD 30 MIN: CPT | Performed by: OPHTHALMOLOGY

## 2024-02-22 ASSESSMENT — REFRACTION_MANIFEST
OS_CYLINDER: -1.00
OS_AXIS: 087
OS_AXIS: 096
OS_VA2: 20/20
OS_ADD: +2.00
OD_VA1: 20/20
OS_SPHERE: -0.50
OS_VA1: 20/20
OS_SPHERE: -1.25
OD_AXIS: 085
OS_VA1: 20/20
OD_AXIS: 105
OS_VA1: 20/25
OD_ADD: +2.00
OS_ADD: +2.00
OD_SPHERE: -1.00
OD_VA1: 20/20
OD_CYLINDER: -1.00
OU_VA: 20/20
OD_AXIS: 091
OD_ADD: +2.00
OD_CYLINDER: -1.00
OS_AXIS: 085
OD_SPHERE: PLANO
OD_VA1: 20/25
OS_SPHERE: -0.50
OD_SPHERE: -0.75
OS_CYLINDER: -0.25
OU_VA: 20/20
OD_VA2: 20/20
OS_CYLINDER: -0.50
OD_CYLINDER: -1.25

## 2024-02-22 ASSESSMENT — REFRACTION_CURRENTRX
OS_SPHERE: -1.25
OD_SPHERE: -1.00
OS_VPRISM_DIRECTION: SV
OD_AXIS: 086
OD_CYLINDER: -1.00
OD_VPRISM_DIRECTION: SV
OD_OVR_VA: 20/
OS_OVR_VA: 20/
OS_CYLINDER: -0.50
OS_AXIS: 081

## 2024-02-22 ASSESSMENT — REFRACTION_AUTOREFRACTION
OD_CYLINDER: -1.50
OS_AXIS: 009
OS_SPHERE: -1.00
OS_CYLINDER: -0.50
OD_AXIS: 102
OD_SPHERE: +0.25

## 2024-02-22 ASSESSMENT — PUNCTA - ASSESSMENT
OD_PUNCTA: SMALL
OS_PUNCTA: SMALL

## 2024-02-22 ASSESSMENT — CONFRONTATIONAL VISUAL FIELD TEST (CVF)
OD_FINDINGS: FULL
OS_FINDINGS: FULL

## 2024-02-22 ASSESSMENT — SPHEQUIV_DERIVED
OD_SPHEQUIV: -0.5
OS_SPHEQUIV: -0.625
OS_SPHEQUIV: -1.5
OS_SPHEQUIV: -1
OS_SPHEQUIV: -1.25
OD_SPHEQUIV: -1.25
OD_SPHEQUIV: -1.5

## 2024-02-22 ASSESSMENT — LID EXAM ASSESSMENTS
OD_BLEPHARITIS: RLL T
OS_BLEPHARITIS: LLL T

## 2024-02-22 ASSESSMENT — SUPERFICIAL PUNCTATE KERATITIS (SPK)
OD_SPK: 1+
OS_SPK: 1+

## 2024-03-08 ENCOUNTER — APPOINTMENT (OUTPATIENT)
Dept: ORTHOPEDIC SURGERY | Facility: CLINIC | Age: 64
End: 2024-03-08
Payer: COMMERCIAL

## 2024-03-08 VITALS — HEIGHT: 64 IN | BODY MASS INDEX: 19.81 KG/M2 | WEIGHT: 116 LBS

## 2024-03-08 DIAGNOSIS — M72.0 PALMAR FASCIAL FIBROMATOSIS [DUPUYTREN]: ICD-10-CM

## 2024-03-08 PROCEDURE — 99213 OFFICE O/P EST LOW 20 MIN: CPT

## 2024-03-08 NOTE — PHYSICAL EXAM
[de-identified] : Examination of the left hand reveals excellent remodeling of the surgical sites.  There is no scar tissue formation however she has acquired opposite side extensor lag at the level of the PIP joint the finger is completely passively correctable.  Upon having the patient make an intrinsic plus posture of the hand she is actually able to activate the extensor tendon at the PIP joint of the small finger which is reassuring

## 2024-03-08 NOTE — ASSESSMENT
[FreeTextEntry1] : ASSESSMENT: Louise returns for follow-up has done well status post Dupuytren's contracture release with a completely passively correctable hand and finger.  At this stage I do recommend occupational therapy to further enhance her range of motion with active motion.  The examination is reassuring.  She verbalized understanding and agreement  Discussion:  1.  She will commence occupational therapy.  We have discussed several activity modifications.  Follow-up as needed

## 2024-03-08 NOTE — HISTORY OF PRESENT ILLNESS
[FreeTextEntry1] : Louise returns for follow-up with Dupuytren's contracture release.  She admits to not going to occupational therapy unfortunately.  She does present with difficulty with range of motion.

## 2024-03-29 NOTE — ED ADULT NURSE NOTE - CHIEF COMPLAINT QUOTE
Received request via: Pharmacy    Was the patient seen in the last year in this department? Yes    Does the patient have an active prescription (recently filled or refills available) for medication(s) requested? No    Ph[facility]    Does the patient have FPC Plus and need 100 day supply (blood pressure, diabetes and cholesterol meds only)? Patient does not have SCP  
Pt A&Ox4, NAD. Pt sent in from hematologist with complaints of vomiting. MD requesting endoscopy to r/o cirrhosis. Breathing even and unlabored.

## 2024-04-04 ENCOUNTER — APPOINTMENT (OUTPATIENT)
Dept: FAMILY MEDICINE | Facility: CLINIC | Age: 64
End: 2024-04-04
Payer: COMMERCIAL

## 2024-04-04 ENCOUNTER — NON-APPOINTMENT (OUTPATIENT)
Age: 64
End: 2024-04-04

## 2024-04-04 VITALS
HEART RATE: 79 BPM | TEMPERATURE: 98.2 F | HEIGHT: 64 IN | SYSTOLIC BLOOD PRESSURE: 104 MMHG | DIASTOLIC BLOOD PRESSURE: 70 MMHG | OXYGEN SATURATION: 97 % | BODY MASS INDEX: 19.81 KG/M2 | WEIGHT: 116 LBS | RESPIRATION RATE: 14 BRPM

## 2024-04-04 DIAGNOSIS — H26.9 UNSPECIFIED CATARACT: ICD-10-CM

## 2024-04-04 DIAGNOSIS — Z01.818 ENCOUNTER FOR OTHER PREPROCEDURAL EXAMINATION: ICD-10-CM

## 2024-04-04 PROCEDURE — 99214 OFFICE O/P EST MOD 30 MIN: CPT

## 2024-04-04 PROCEDURE — 93000 ELECTROCARDIOGRAM COMPLETE: CPT

## 2024-04-04 PROCEDURE — 36415 COLL VENOUS BLD VENIPUNCTURE: CPT

## 2024-04-04 RX ORDER — SERTRALINE 25 MG/1
25 TABLET, FILM COATED ORAL
Refills: 0 | Status: DISCONTINUED | COMMUNITY
End: 2024-04-04

## 2024-04-04 RX ORDER — MELOXICAM 15 MG/1
15 TABLET ORAL
Qty: 30 | Refills: 0 | Status: DISCONTINUED | COMMUNITY
Start: 2023-10-12 | End: 2024-04-04

## 2024-04-04 NOTE — COUNSELING
[Behavioral health counseling provided] : behavioral health  [Fall prevention counseling provided] : fall prevention

## 2024-04-08 LAB
ANION GAP SERPL CALC-SCNC: 15 MMOL/L
BUN SERPL-MCNC: 10 MG/DL
CALCIUM SERPL-MCNC: 9.8 MG/DL
CHLORIDE SERPL-SCNC: 102 MMOL/L
CO2 SERPL-SCNC: 23 MMOL/L
CREAT SERPL-MCNC: 0.61 MG/DL
EGFR: 100 ML/MIN/1.73M2
GLUCOSE SERPL-MCNC: 107 MG/DL
HCT VFR BLD CALC: 41.7 %
HGB BLD-MCNC: 14.2 G/DL
INR PPP: 0.96 RATIO
MCHC RBC-ENTMCNC: 32.6 PG
MCHC RBC-ENTMCNC: 34.1 GM/DL
MCV RBC AUTO: 95.9 FL
PLATELET # BLD AUTO: 321 K/UL
POTASSIUM SERPL-SCNC: 4.7 MMOL/L
PT BLD: 10.9 SEC
RBC # BLD: 4.35 M/UL
RBC # FLD: 12.6 %
SODIUM SERPL-SCNC: 140 MMOL/L
WBC # FLD AUTO: 4.17 K/UL

## 2024-04-08 NOTE — ASSESSMENT
[High Risk Surgery - Intraperitoneal, Intrathoracic or Supringuinal Vascular Procedures] : High Risk Surgery - Intraperitoneal, Intrathoracic or Supringuinal Vascular Procedures - No (0) [Ischemic Heart Disease] : Ischemic Heart Disease - No (0) [Congestive Heart Failure] : Congestive Heart Failure - No (0) [Prior Cerebrovascular Accident or TIA] : Prior Cerebrovascular Accident or TIA - No (0) [Creatinine >= 2mg/dL (1 Point)] : Creatinine >= 2mg/dL - No (0) [Insulin-dependent Diabetic (1 Point)] : Insulin-dependent Diabetic - No (0) [0] : 0 , RCRI Class: I, Risk of Post-Op Cardiac Complications: 3.9%, 95% CI for Risk Estimate: 2.8% - 5.4% [As per surgery] : as per surgery [FreeTextEntry4] : Awaiting presurgical testing done today

## 2024-04-08 NOTE — PLAN
Her liver enzymes have been climbing since Nov 2021 so I don't think it can be directly linked back to the most recent . Isosorbide was started in Sept 2021. I'm going to send a message to her cardiologist and see what their experience is with this potential side effect. I'll let her know what I hear back. In the meantime, I'd like her to get a liver ultrasound and some additional blood work to further evaluate for causes. Is she taking Tylenol or any products with Acetaminophen for her pain? [FreeTextEntry1] : 4/08/24 pt with no absolute contraindication for surgical procedure. Clar from clinical stand point.

## 2024-04-08 NOTE — HISTORY OF PRESENT ILLNESS
[No Pertinent Cardiac History] : no history of aortic stenosis, atrial fibrillation, coronary artery disease, recent myocardial infarction, or implantable device/pacemaker [No Pertinent Pulmonary History] : no history of asthma, COPD, sleep apnea, or smoking [No Adverse Anesthesia Reaction] : no adverse anesthesia reaction in self or family member [(Patient denies any chest pain, claudication, dyspnea on exertion, orthopnea, palpitations or syncope)] : Patient denies any chest pain, claudication, dyspnea on exertion, orthopnea, palpitations or syncope [Chronic Anticoagulation] : no chronic anticoagulation [Chronic Kidney Disease] : no chronic kidney disease [Diabetes] : no diabetes [FreeTextEntry1] : B/L Cataract sx  [FreeTextEntry2] : 04/10/24 & 4/24/24 [FreeTextEntry3] : Dr Mills [FreeTextEntry4] : Patient is a 61 yo F w/ PMH of ETOH abuse, Elevated Ferritin, Hepatic Steatosis, Hx GI bleed admitted to [facility] on 09/2023. Sober since 09/2023. EGD/ Colonoscopy 09/2023 WNL.  Here for medical clearance. Feels good overall.

## 2024-04-10 ENCOUNTER — ASC (OUTPATIENT)
Dept: URBAN - METROPOLITAN AREA SURGERY 8 | Facility: SURGERY | Age: 64
Setting detail: OPHTHALMOLOGY
End: 2024-04-10
Payer: COMMERCIAL

## 2024-04-10 DIAGNOSIS — H25.11: ICD-10-CM

## 2024-04-10 DIAGNOSIS — H52.211: ICD-10-CM

## 2024-04-10 PROCEDURE — FEMTO FEMTOSECOND LASER: Mod: GY | Performed by: OPHTHALMOLOGY

## 2024-04-10 PROCEDURE — 66984 XCAPSL CTRC RMVL W/O ECP: CPT | Mod: RT | Performed by: OPHTHALMOLOGY

## 2024-04-11 ENCOUNTER — OFFICE (OUTPATIENT)
Dept: URBAN - METROPOLITAN AREA CLINIC 94 | Facility: CLINIC | Age: 64
Setting detail: OPHTHALMOLOGY
End: 2024-04-11
Payer: COMMERCIAL

## 2024-04-11 DIAGNOSIS — Z96.1: ICD-10-CM

## 2024-04-11 PROCEDURE — 99024 POSTOP FOLLOW-UP VISIT: CPT | Performed by: OPHTHALMOLOGY

## 2024-04-11 ASSESSMENT — LID EXAM ASSESSMENTS
OD_BLEPHARITIS: RLL T
OS_BLEPHARITIS: LLL T

## 2024-04-17 ENCOUNTER — OFFICE (OUTPATIENT)
Dept: URBAN - METROPOLITAN AREA CLINIC 115 | Facility: CLINIC | Age: 64
Setting detail: OPHTHALMOLOGY
End: 2024-04-17
Payer: COMMERCIAL

## 2024-04-17 DIAGNOSIS — H25.12: ICD-10-CM

## 2024-04-17 PROCEDURE — 99024 POSTOP FOLLOW-UP VISIT: CPT | Performed by: OPHTHALMOLOGY

## 2024-04-17 PROCEDURE — 92136 OPHTHALMIC BIOMETRY: CPT | Performed by: OPHTHALMOLOGY

## 2024-04-17 ASSESSMENT — LID EXAM ASSESSMENTS
OS_BLEPHARITIS: LLL T
OD_BLEPHARITIS: RLL T

## 2024-04-24 ENCOUNTER — ASC (OUTPATIENT)
Dept: URBAN - METROPOLITAN AREA SURGERY 8 | Facility: SURGERY | Age: 64
Setting detail: OPHTHALMOLOGY
End: 2024-04-24
Payer: COMMERCIAL

## 2024-04-24 DIAGNOSIS — H52.212: ICD-10-CM

## 2024-04-24 DIAGNOSIS — H25.12: ICD-10-CM

## 2024-04-24 PROCEDURE — FEMTO FEMTOSECOND LASER: Mod: GY | Performed by: OPHTHALMOLOGY

## 2024-04-24 PROCEDURE — 66984 XCAPSL CTRC RMVL W/O ECP: CPT | Mod: 79,LT | Performed by: OPHTHALMOLOGY

## 2024-04-25 ENCOUNTER — OFFICE (OUTPATIENT)
Dept: URBAN - METROPOLITAN AREA CLINIC 94 | Facility: CLINIC | Age: 64
Setting detail: OPHTHALMOLOGY
End: 2024-04-25
Payer: COMMERCIAL

## 2024-04-25 DIAGNOSIS — Z96.1: ICD-10-CM

## 2024-04-25 DIAGNOSIS — H25.12: ICD-10-CM

## 2024-04-25 PROCEDURE — 99024 POSTOP FOLLOW-UP VISIT: CPT | Performed by: OPHTHALMOLOGY

## 2024-04-25 ASSESSMENT — LID EXAM ASSESSMENTS
OD_BLEPHARITIS: RLL T
OS_BLEPHARITIS: LLL T

## 2024-05-03 ENCOUNTER — OFFICE (OUTPATIENT)
Dept: URBAN - METROPOLITAN AREA CLINIC 115 | Facility: CLINIC | Age: 64
Setting detail: OPHTHALMOLOGY
End: 2024-05-03
Payer: COMMERCIAL

## 2024-05-03 ENCOUNTER — RX ONLY (RX ONLY)
Age: 64
End: 2024-05-03

## 2024-05-03 DIAGNOSIS — Z96.1: ICD-10-CM

## 2024-05-03 PROCEDURE — 99024 POSTOP FOLLOW-UP VISIT: CPT | Performed by: OPHTHALMOLOGY

## 2024-05-03 ASSESSMENT — CONFRONTATIONAL VISUAL FIELD TEST (CVF)
OS_FINDINGS: FULL
OD_FINDINGS: FULL

## 2024-05-03 ASSESSMENT — LID EXAM ASSESSMENTS
OS_BLEPHARITIS: LLL T
OD_BLEPHARITIS: RLL T

## 2024-06-28 ENCOUNTER — OFFICE (OUTPATIENT)
Dept: URBAN - METROPOLITAN AREA CLINIC 115 | Facility: CLINIC | Age: 64
Setting detail: OPHTHALMOLOGY
End: 2024-06-28
Payer: COMMERCIAL

## 2024-06-28 DIAGNOSIS — H01.005: ICD-10-CM

## 2024-06-28 DIAGNOSIS — H16.222: ICD-10-CM

## 2024-06-28 DIAGNOSIS — H01.002: ICD-10-CM

## 2024-06-28 DIAGNOSIS — H16.221: ICD-10-CM

## 2024-06-28 PROCEDURE — 99024 POSTOP FOLLOW-UP VISIT: CPT | Performed by: OPHTHALMOLOGY

## 2024-06-28 PROCEDURE — 83861 MICROFLUID ANALY TEARS: CPT | Mod: QW,LT | Performed by: OPHTHALMOLOGY

## 2024-06-28 PROCEDURE — 83861 MICROFLUID ANALY TEARS: CPT | Mod: QW,RT | Performed by: OPHTHALMOLOGY

## 2024-06-28 ASSESSMENT — CONFRONTATIONAL VISUAL FIELD TEST (CVF)
OD_FINDINGS: FULL
OS_FINDINGS: FULL

## 2024-06-28 ASSESSMENT — LID EXAM ASSESSMENTS
OS_BLEPHARITIS: LLL T
OD_BLEPHARITIS: RLL T

## 2024-10-17 NOTE — ED ADULT NURSE NOTE - SUICIDE SCREENING QUESTION 3
No
Detail Level: Detailed
X Size Of Lesion In Cm (Optional): 0
Anatomic Location From Referring Provider: left posterior neck

## 2024-11-29 ENCOUNTER — TRANSCRIPTION ENCOUNTER (OUTPATIENT)
Age: 64
End: 2024-11-29

## 2024-12-13 ENCOUNTER — OFFICE (OUTPATIENT)
Dept: URBAN - METROPOLITAN AREA CLINIC 115 | Facility: CLINIC | Age: 64
Setting detail: OPHTHALMOLOGY
End: 2024-12-13
Payer: COMMERCIAL

## 2024-12-13 DIAGNOSIS — Z96.1: ICD-10-CM

## 2024-12-13 DIAGNOSIS — H52.4: ICD-10-CM

## 2024-12-13 DIAGNOSIS — H01.005: ICD-10-CM

## 2024-12-13 DIAGNOSIS — Q14.8: ICD-10-CM

## 2024-12-13 DIAGNOSIS — H16.223: ICD-10-CM

## 2024-12-13 DIAGNOSIS — H16.222: ICD-10-CM

## 2024-12-13 DIAGNOSIS — H16.221: ICD-10-CM

## 2024-12-13 DIAGNOSIS — H43.813: ICD-10-CM

## 2024-12-13 DIAGNOSIS — H01.002: ICD-10-CM

## 2024-12-13 PROCEDURE — 92015 DETERMINE REFRACTIVE STATE: CPT | Performed by: OPHTHALMOLOGY

## 2024-12-13 PROCEDURE — 92014 COMPRE OPH EXAM EST PT 1/>: CPT | Performed by: OPHTHALMOLOGY

## 2024-12-13 PROCEDURE — 92250 FUNDUS PHOTOGRAPHY W/I&R: CPT | Performed by: OPHTHALMOLOGY

## 2024-12-13 ASSESSMENT — REFRACTION_MANIFEST
OD_VA2: 20/20
OU_VA: 20/20
OD_VA1: 20/25
OD_SPHERE: -1.00
OD_AXIS: 091
OD_ADD: +2.00
OS_CYLINDER: -0.75
OD_CYLINDER: -1.00
OS_VA1: 20/25
OS_VA1: 20/20
OS_CYLINDER: -1.00
OD_CYLINDER: -1.00
OS_AXIS: 096
OS_SPHERE: +0.75
OU_VA: 20/20
OD_AXIS: 105
OD_ADD: +2.00
OS_SPHERE: -0.50
OS_VA1: 20/20-1
OU_VA: 20/20
OD_VA1: 20/20
OD_AXIS: 085
OS_VA1: 20/20
OS_ADD: +2.00
OD_SPHERE: PLANO
OS_SPHERE: -0.50
OS_AXIS: 085
OD_VA1: 20/20
OD_SPHERE: -0.75
OS_VA2: 20/20
OS_CYLINDER: -0.25
OS_ADD: +2.00
OD_CYLINDER: -1.75
OS_CYLINDER: -0.50
OD_SPHERE: +1.25
OS_AXIS: 087
OS_ADD: +1.75
OD_CYLINDER: -1.25
OD_ADD: +1.75
OD_VA1: 20/25
OS_AXIS: 170
OS_SPHERE: -1.25
OD_AXIS: 085

## 2024-12-13 ASSESSMENT — TONOMETRY
OD_IOP_MMHG: 15
OS_IOP_MMHG: 15

## 2024-12-13 ASSESSMENT — CONFRONTATIONAL VISUAL FIELD TEST (CVF)
OS_FINDINGS: FULL
OD_FINDINGS: FULL

## 2024-12-13 ASSESSMENT — REFRACTION_AUTOREFRACTION
OS_CYLINDER: -0.25
OD_AXIS: 082
OD_CYLINDER: -1.50
OD_SPHERE: +0.75
OS_AXIS: 141
OS_SPHERE: 0.00

## 2024-12-13 ASSESSMENT — REFRACTION_CURRENTRX
OS_VPRISM_DIRECTION: SV
OD_VPRISM_DIRECTION: SV
OD_OVR_VA: 20/
OS_AXIS: 081
OS_OVR_VA: 20/
OD_SPHERE: -1.00
OS_CYLINDER: -0.50
OD_AXIS: 086
OD_CYLINDER: -1.00
OS_SPHERE: -1.25

## 2024-12-13 ASSESSMENT — SUPERFICIAL PUNCTATE KERATITIS (SPK)
OS_SPK: 1+
OD_SPK: 1+

## 2024-12-13 ASSESSMENT — VISUAL ACUITY
OD_BCVA: 20/25-1
OS_BCVA: 20/40

## 2024-12-13 ASSESSMENT — LID EXAM ASSESSMENTS
OD_BLEPHARITIS: RLL T
OS_BLEPHARITIS: LLL T

## 2024-12-13 ASSESSMENT — PUNCTA - ASSESSMENT
OS_PUNCTA: SMALL
OD_PUNCTA: SMALL

## 2024-12-22 NOTE — ED ADULT NURSE NOTE - SUICIDE SCREENING QUESTION 3
Initial SW/CM Assessment/Plan of Care Note     Baseline Assessment  72 year old admitted 12/21/2024 as Inpatient with a diagnosis of generalized weakness.   Prior to admission patient was living with Alone and residing at House.  Patient does  have a Power of  for Healthcare.  Document is not activated.  Agent is Maximino Vila. Patient’s Primary Care Provider is Edgardo Vivas MD.     Progress Note  Patient was asleep when SW entered the room but his POA, Maximino, answered all questions. Per Maximino, patient resides alone; has 6-steps to his front door and about 12-steps to his second floor bedroom and 12-steps to the basement/laundry. Patient has no DMEs, homemaker, or home health services. Maximino says patient needs a homemaker, but every time he broach the subject patient gets upset. Maximino states he checks on patient about 3-4 times a week and will take patient grocery shopping, to his doctor appointments, do a little cleaning, or assist however he can. Anant says patient is independent with ADLs but because of patient's mobility needs help putting on his jacket or other clothes. Maximino also feels there are times when patient's gait is unsteady. PCP, insurance and pharmacy ([facility]) verified. Last PCP visit was three months ago, sees PCP every other month. Maximino says he is patient's only support & will transport pt home when discharged. CM/SW will continue to follow for discharge needs.      Plan  Patient/Family Discharge Goal: Home   Is patient/family goal achievable: Yes    SW/CM - Recommendations for Discharge: Home     Barriers to Discharge  Identified Barriers to Discharge/Transition Planning:          Anticipate patient will not need post-hospital services. Necessary services are available.  Anticipate patient can return to the environment from which patient entered the hospital.   Anticipate patient can provide self-care at discharge.    Refer to SW/CM Flowsheet for objective data.     Medical History  No past  medical history on file.    Prior to Admission Status  Functional Status  Ambulation: Independent/Self  Bathing: Independent/Self  Dressing: Independent/Self  Toileting: Independent/Self  Meal Preparation: Independent/Self, Other (comment)  Shopping: Independent/Self, Other (comment)  Medication Preparation: Independent/Self  Medication Administration: Independent/Self  Housekeeping: Other (comment), Independent/Self  Laundry Location: Other Level  Transportation: Other (comment)    Agency/Support  Type of Services Prior to Hospitalization: None               Support Systems: Other (Comment) (POA)   Home Devices/Equipment: None           Mobility Assist Devices: None  Sensory Support Devices: Dentures    Prior Function                Current Function  Last Filed Values       None            Therapy Recommendations for Discharge:   PT:      Last Filed Values       None          OT:       Last Filed Values       None          SLP:    Last Filed Values       None            Insurance  Primary: [employer] MEDICARE  Secondary: N/A    Disposition Recommendations:  [place] recommendation for discharge: Home            No

## 2024-12-30 ENCOUNTER — OFFICE (OUTPATIENT)
Dept: URBAN - METROPOLITAN AREA CLINIC 115 | Facility: CLINIC | Age: 64
Setting detail: OPHTHALMOLOGY
End: 2024-12-30
Payer: COMMERCIAL

## 2024-12-30 DIAGNOSIS — H43.813: ICD-10-CM

## 2024-12-30 DIAGNOSIS — H35.032: ICD-10-CM

## 2024-12-30 PROCEDURE — 92134 CPTRZ OPH DX IMG PST SGM RTA: CPT | Performed by: OPHTHALMOLOGY

## 2024-12-30 PROCEDURE — 92012 INTRM OPH EXAM EST PATIENT: CPT | Performed by: OPHTHALMOLOGY

## 2024-12-30 ASSESSMENT — REFRACTION_AUTOREFRACTION
OS_CYLINDER: -0.25
OS_SPHERE: 0.00
OD_CYLINDER: -1.50
OD_SPHERE: +0.75
OD_AXIS: 082
OS_AXIS: 141

## 2024-12-30 ASSESSMENT — REFRACTION_CURRENTRX
OS_OVR_VA: 20/
OS_CYLINDER: -0.50
OS_AXIS: 081
OD_AXIS: 086
OD_OVR_VA: 20/
OS_SPHERE: -1.25
OS_VPRISM_DIRECTION: SV
OD_SPHERE: -1.00
OD_CYLINDER: -1.00
OD_VPRISM_DIRECTION: SV

## 2024-12-30 ASSESSMENT — LID EXAM ASSESSMENTS
OD_BLEPHARITIS: RLL T
OS_BLEPHARITIS: LLL T

## 2024-12-30 ASSESSMENT — PUNCTA - ASSESSMENT
OS_PUNCTA: SMALL
OD_PUNCTA: SMALL

## 2024-12-30 ASSESSMENT — TONOMETRY
OD_IOP_MMHG: 15
OS_IOP_MMHG: 16

## 2024-12-30 ASSESSMENT — VISUAL ACUITY
OD_BCVA: 20/25
OS_BCVA: 20/40

## 2024-12-30 ASSESSMENT — CONFRONTATIONAL VISUAL FIELD TEST (CVF)
OD_FINDINGS: FULL
OS_FINDINGS: FULL

## 2024-12-30 ASSESSMENT — SUPERFICIAL PUNCTATE KERATITIS (SPK)
OS_SPK: 1+
OD_SPK: 1+

## 2025-03-11 ENCOUNTER — APPOINTMENT (OUTPATIENT)
Dept: OBGYN | Facility: CLINIC | Age: 65
End: 2025-03-11

## 2025-05-07 ENCOUNTER — APPOINTMENT (OUTPATIENT)
Dept: FAMILY MEDICINE | Facility: CLINIC | Age: 65
End: 2025-05-07
Payer: COMMERCIAL

## 2025-05-07 VITALS
HEART RATE: 80 BPM | SYSTOLIC BLOOD PRESSURE: 130 MMHG | OXYGEN SATURATION: 98 % | RESPIRATION RATE: 14 BRPM | TEMPERATURE: 97.2 F | HEIGHT: 64 IN | DIASTOLIC BLOOD PRESSURE: 80 MMHG | WEIGHT: 117 LBS | BODY MASS INDEX: 19.97 KG/M2

## 2025-05-07 DIAGNOSIS — F10.21 ALCOHOL DEPENDENCE, IN REMISSION: ICD-10-CM

## 2025-05-07 DIAGNOSIS — R29.898 OTHER SYMPTOMS AND SIGNS INVOLVING THE MUSCULOSKELETAL SYSTEM: ICD-10-CM

## 2025-05-07 DIAGNOSIS — R79.89 OTHER SPECIFIED ABNORMAL FINDINGS OF BLOOD CHEMISTRY: ICD-10-CM

## 2025-05-07 PROCEDURE — G2211 COMPLEX E/M VISIT ADD ON: CPT | Mod: NC

## 2025-05-07 PROCEDURE — 99214 OFFICE O/P EST MOD 30 MIN: CPT

## 2025-05-07 PROCEDURE — 36415 COLL VENOUS BLD VENIPUNCTURE: CPT

## 2025-05-09 ENCOUNTER — NON-APPOINTMENT (OUTPATIENT)
Age: 65
End: 2025-05-09

## 2025-05-10 ENCOUNTER — EMERGENCY (EMERGENCY)
Facility: HOSPITAL | Age: 65
LOS: 1 days | End: 2025-05-10
Attending: EMERGENCY MEDICINE
Payer: COMMERCIAL

## 2025-05-10 VITALS
TEMPERATURE: 98 F | SYSTOLIC BLOOD PRESSURE: 135 MMHG | OXYGEN SATURATION: 98 % | WEIGHT: 119.93 LBS | DIASTOLIC BLOOD PRESSURE: 89 MMHG | HEIGHT: 64 IN | HEART RATE: 98 BPM | RESPIRATION RATE: 20 BRPM

## 2025-05-10 VITALS
RESPIRATION RATE: 16 BRPM | SYSTOLIC BLOOD PRESSURE: 156 MMHG | OXYGEN SATURATION: 100 % | DIASTOLIC BLOOD PRESSURE: 91 MMHG | HEART RATE: 68 BPM

## 2025-05-10 LAB
ALBUMIN SERPL ELPH-MCNC: 4.3 G/DL — SIGNIFICANT CHANGE UP (ref 3.3–5.2)
ALP SERPL-CCNC: 72 U/L — SIGNIFICANT CHANGE UP (ref 40–120)
ALT FLD-CCNC: 36 U/L — HIGH
AMMONIA BLD-MCNC: 17 UMOL/L — SIGNIFICANT CHANGE UP (ref 11–55)
ANION GAP SERPL CALC-SCNC: 14 MMOL/L — SIGNIFICANT CHANGE UP (ref 5–17)
APPEARANCE UR: ABNORMAL
APTT BLD: 31.2 SEC — SIGNIFICANT CHANGE UP (ref 26.1–36.8)
AST SERPL-CCNC: 69 U/L — HIGH
BACTERIA # UR AUTO: NEGATIVE /HPF — SIGNIFICANT CHANGE UP
BASOPHILS # BLD AUTO: 0.04 K/UL — SIGNIFICANT CHANGE UP (ref 0–0.2)
BASOPHILS NFR BLD AUTO: 1.1 % — SIGNIFICANT CHANGE UP (ref 0–2)
BILIRUB SERPL-MCNC: 0.7 MG/DL — SIGNIFICANT CHANGE UP (ref 0.4–2)
BILIRUB UR-MCNC: NEGATIVE — SIGNIFICANT CHANGE UP
BLD GP AB SCN SERPL QL: SIGNIFICANT CHANGE UP
BUN SERPL-MCNC: 10.8 MG/DL — SIGNIFICANT CHANGE UP (ref 8–20)
CALCIUM SERPL-MCNC: 9 MG/DL — SIGNIFICANT CHANGE UP (ref 8.4–10.5)
CAST: 0 /LPF — SIGNIFICANT CHANGE UP (ref 0–4)
CHLORIDE SERPL-SCNC: 98 MMOL/L — SIGNIFICANT CHANGE UP (ref 96–108)
CO2 SERPL-SCNC: 24 MMOL/L — SIGNIFICANT CHANGE UP (ref 22–29)
COLOR SPEC: YELLOW — SIGNIFICANT CHANGE UP
CREAT SERPL-MCNC: 0.42 MG/DL — LOW (ref 0.5–1.3)
DIFF PNL FLD: NEGATIVE — SIGNIFICANT CHANGE UP
EGFR: 108 ML/MIN/1.73M2 — SIGNIFICANT CHANGE UP
EGFR: 108 ML/MIN/1.73M2 — SIGNIFICANT CHANGE UP
EOSINOPHIL # BLD AUTO: 0.1 K/UL — SIGNIFICANT CHANGE UP (ref 0–0.5)
EOSINOPHIL NFR BLD AUTO: 2.6 % — SIGNIFICANT CHANGE UP (ref 0–6)
ETHANOL SERPL-MCNC: <10 MG/DL — SIGNIFICANT CHANGE UP (ref 0–9)
GLUCOSE SERPL-MCNC: 116 MG/DL — HIGH (ref 70–99)
GLUCOSE UR QL: NEGATIVE MG/DL — SIGNIFICANT CHANGE UP
HCT VFR BLD CALC: 39.2 % — SIGNIFICANT CHANGE UP (ref 34.5–45)
HGB BLD-MCNC: 14 G/DL — SIGNIFICANT CHANGE UP (ref 11.5–15.5)
IMM GRANULOCYTES # BLD AUTO: 0.01 K/UL — SIGNIFICANT CHANGE UP (ref 0–0.07)
IMM GRANULOCYTES NFR BLD AUTO: 0.3 % — SIGNIFICANT CHANGE UP (ref 0–0.9)
INR BLD: 1.05 RATIO — SIGNIFICANT CHANGE UP (ref 0.85–1.16)
KETONES UR-MCNC: NEGATIVE MG/DL — SIGNIFICANT CHANGE UP
LEUKOCYTE ESTERASE UR-ACNC: NEGATIVE — SIGNIFICANT CHANGE UP
LIDOCAIN IGE QN: 74 U/L — HIGH (ref 22–51)
LYMPHOCYTES # BLD AUTO: 0.76 K/UL — LOW (ref 1–3.3)
LYMPHOCYTES NFR BLD AUTO: 20 % — SIGNIFICANT CHANGE UP (ref 13–44)
MCHC RBC-ENTMCNC: 35.7 G/DL — SIGNIFICANT CHANGE UP (ref 32–36)
MCHC RBC-ENTMCNC: 35.9 PG — HIGH (ref 27–34)
MCV RBC AUTO: 100.5 FL — HIGH (ref 80–100)
MONOCYTES # BLD AUTO: 0.55 K/UL — SIGNIFICANT CHANGE UP (ref 0–0.9)
MONOCYTES NFR BLD AUTO: 14.5 % — HIGH (ref 2–14)
NEUTROPHILS # BLD AUTO: 2.34 K/UL — SIGNIFICANT CHANGE UP (ref 1.8–7.4)
NEUTROPHILS NFR BLD AUTO: 61.5 % — SIGNIFICANT CHANGE UP (ref 43–77)
NITRITE UR-MCNC: NEGATIVE — SIGNIFICANT CHANGE UP
NRBC # BLD AUTO: 0 K/UL — SIGNIFICANT CHANGE UP (ref 0–0)
NRBC # FLD: 0 K/UL — SIGNIFICANT CHANGE UP (ref 0–0)
NRBC BLD AUTO-RTO: 0 /100 WBCS — SIGNIFICANT CHANGE UP (ref 0–0)
PH UR: 7 — SIGNIFICANT CHANGE UP (ref 5–8)
PLATELET # BLD AUTO: 127 K/UL — LOW (ref 150–400)
PMV BLD: 8.8 FL — SIGNIFICANT CHANGE UP (ref 7–13)
POTASSIUM SERPL-MCNC: 4.1 MMOL/L — SIGNIFICANT CHANGE UP (ref 3.5–5.3)
POTASSIUM SERPL-SCNC: 4.1 MMOL/L — SIGNIFICANT CHANGE UP (ref 3.5–5.3)
PROT SERPL-MCNC: 7.5 G/DL — SIGNIFICANT CHANGE UP (ref 6.6–8.7)
PROT UR-MCNC: SIGNIFICANT CHANGE UP MG/DL
PROTHROM AB SERPL-ACNC: 11.9 SEC — SIGNIFICANT CHANGE UP (ref 9.9–13.4)
RBC # BLD: 3.9 M/UL — SIGNIFICANT CHANGE UP (ref 3.8–5.2)
RBC # FLD: 11.9 % — SIGNIFICANT CHANGE UP (ref 10.3–14.5)
RBC CASTS # UR COMP ASSIST: 0 /HPF — SIGNIFICANT CHANGE UP (ref 0–4)
SODIUM SERPL-SCNC: 136 MMOL/L — SIGNIFICANT CHANGE UP (ref 135–145)
SP GR SPEC: 1.01 — SIGNIFICANT CHANGE UP (ref 1–1.03)
SQUAMOUS # UR AUTO: 1 /HPF — SIGNIFICANT CHANGE UP (ref 0–5)
UROBILINOGEN FLD QL: 1 MG/DL — SIGNIFICANT CHANGE UP (ref 0.2–1)
WBC # BLD: 3.8 K/UL — SIGNIFICANT CHANGE UP (ref 3.8–10.5)
WBC # FLD AUTO: 3.8 K/UL — SIGNIFICANT CHANGE UP (ref 3.8–10.5)
WBC UR QL: 0 /HPF — SIGNIFICANT CHANGE UP (ref 0–5)

## 2025-05-10 PROCEDURE — 80307 DRUG TEST PRSMV CHEM ANLYZR: CPT

## 2025-05-10 PROCEDURE — 82140 ASSAY OF AMMONIA: CPT

## 2025-05-10 PROCEDURE — 99284 EMERGENCY DEPT VISIT MOD MDM: CPT | Mod: 25

## 2025-05-10 PROCEDURE — 82962 GLUCOSE BLOOD TEST: CPT

## 2025-05-10 PROCEDURE — 80053 COMPREHEN METABOLIC PANEL: CPT

## 2025-05-10 PROCEDURE — 36415 COLL VENOUS BLD VENIPUNCTURE: CPT

## 2025-05-10 PROCEDURE — 86850 RBC ANTIBODY SCREEN: CPT

## 2025-05-10 PROCEDURE — 81001 URINALYSIS AUTO W/SCOPE: CPT

## 2025-05-10 PROCEDURE — 86901 BLOOD TYPING SEROLOGIC RH(D): CPT

## 2025-05-10 PROCEDURE — 70450 CT HEAD/BRAIN W/O DYE: CPT | Mod: 26

## 2025-05-10 PROCEDURE — 85730 THROMBOPLASTIN TIME PARTIAL: CPT

## 2025-05-10 PROCEDURE — 99285 EMERGENCY DEPT VISIT HI MDM: CPT

## 2025-05-10 PROCEDURE — 83690 ASSAY OF LIPASE: CPT

## 2025-05-10 PROCEDURE — 86900 BLOOD TYPING SEROLOGIC ABO: CPT

## 2025-05-10 PROCEDURE — 70450 CT HEAD/BRAIN W/O DYE: CPT

## 2025-05-10 PROCEDURE — 85610 PROTHROMBIN TIME: CPT

## 2025-05-10 PROCEDURE — 85025 COMPLETE CBC W/AUTO DIFF WBC: CPT

## 2025-05-10 NOTE — ED PROVIDER NOTE - NSFOLLOWUPINSTRUCTIONS_ED_ALL_ED_FT
You are advised to please follow up with your primary care doctor within the next 24 hours and return to the Emergency Department for worsening symptoms or any other concerns.  Your doctor may call [phone] to follow up on the specific results of the tests performed today in the emergency department.    [address] [address]  [phone]    Alcohol Abuse    Alcohol intoxication occurs when the amount of alcohol that a person has consumed impairs his or her ability to mentally and physically function. Chronic alcohol consumption can also lead to a variety of health issues including neurological disease, stomach disease, heart disease, liver disease, etc. Do not drive after drinking alcohol. Drinking enough alcohol to end up in an Emergency Room suggests you may have an alcohol abuse problem. Seek help at a drug addiction center.    SEEK IMMEDIATE MEDICAL CARE IF YOU HAVE ANY OF THE FOLLOWING SYMPTOMS: seizures, vomiting blood, blood in your stool, lightheadedness/dizziness, or becoming shaky to tremulous when you stop drinking.

## 2025-05-10 NOTE — ED ADULT TRIAGE NOTE - BP NONINVASIVE SYSTOLIC (MM HG)
135 No. ISELA screening performed.  STOP BANG Legend: 0-2 = LOW Risk; 3-4 = INTERMEDIATE Risk; 5-8 = HIGH Risk/never tested

## 2025-05-10 NOTE — ED PROVIDER NOTE - NS ED ROS FT
[facility]  Hospitalist Discharge Summary      Date of Admission:  9/3/2022  Date of Discharge:  9/6/2022  7:06 PM  Discharging Provider: Kadie Ospina MD  Discharge Service: Hospitalist Service    Discharge Diagnoses      #.  Acute displaced left intertrochanteric femur fracture-secondary to mechanical fall    #.  S/p left hip cephalomedullary nailing, 9/4/2022  -Aspirin 325 mg daily for 6 weeks DVT Prophylaxis         #.  Acute alcohol intoxication, blood alcohol level 0.21 g/dL    #.  Chronic alcohol use disorder     #.  Hypomagnesemia, magnesium 1.5  -Replaced      #.  Chronic hyponatremia, sodium 129     #.  Acute blood loss anemia Due to surgical blood loss on top of chronic normocytic anemia   - Start on p.o. iron once no longer on narcotics to prevent additional constipation    #.  Essential hypertension     #.  Chronic depression    #.  Generalized anxiety disorder  -Continue Lexapro, trazodone, lamotrigine     #.  Hypoalbuminemia, albumin 2     #.  Urinary retention-s/p Prieto placement      Follow-ups Needed After Discharge   Follow-up Appointments     Follow Up Care      Please follow-up with Dr Cochran and Ayde GOMEZ, [facility], in clinic for a 2 week post-op appointment.  At   this visit, your post-op dressing will be removed and any staples or   sutures taken out.  Please call Dr Cochran's patient coordinator, Jerri,   to schedule this appointment and also with any questions or concerns.  She   can be reached during normal business hours (M-F, 8am-5pm) at   [phone].   If you have any questions or concerns after-hours, please   contact the on call surgeon at [phone].                         If you are unable to make the 2 week post-op appointment because   you are at a care facility, the rehab nursing team can assist with removal   of your post-op dressing and staples (see below) however, please make sure   you still contact  Dr Cochran's patient coordinator to set up a 6 week   post-op appointment.  At the 6-week post-op visit, Dr Cochran's team will   assess your incision and also take x-rays.         Follow Up and recommended labs and tests      Follow up with Nursing home physician.  No follow up labs or test are   needed.  Voiding trial in 1 to 2 weeks         {Additional follow-up instructions/to-do's for PCP    : Consider starting on supplemental iron when no longer on narcotics    Unresulted Labs Ordered in the Past 30 Days of this Admission     No orders found from 8/4/2022 to 9/4/2022.          Discharge Disposition   Discharged to rehabilitation facility  Condition at discharge: Stable      Hospital Course      Suresh Powers is a 61 year old male who was admitted on 9/3/2022 with right hip pain due to hip fracture secondary to fall.  He was intoxicated and fell off of a bar stool when he tried to get up.   Imaging showed left displaced intertrochanteric femur fracture.  Ortho surgery consulted.  He underwent left hip cephalomedullary nailing 9/4/2022.  He developed urinary retention postprocedure requiring Prieto catheter placement.  He was discharged to TCU for rehab.  Prieto catheter was left in place.  Can undergo voiding trial at TCU.        #.  Acute displaced left intertrochanteric femur fracture-secondary to mechanical fall  #.  S/p left hip cephalomedullary nailing, 9/4/2022  - PT/OT, pain control  -Weightbearing as tolerated  -Aspirin 325 mg daily for 6 weeks DVT Prophylaxis         #.  Acute alcohol intoxication, blood alcohol level 0.21 g/dL  #.  Chronic alcohol use disorder  - had 6 beers with his friends as it was Saturday and they were watching a game.  Reports he has poor balance at baseline, is on disability due to multiple foot injuries,  reports history of falls even without alcohol use.  - Discussed with patient since he already is at increased risk of falls due to poor balance and previous foot injuries, he  should avoid alcohol as it increases his risk of falls.  - No signs of withdrawal  -LFTs in normal range     #.  Hypomagnesemia, magnesium 1.5  -Replaced per protocol. now 2.2     #.  Chronic hyponatremia, sodium 129  -This is his baseline.   -Chlorthalidone was recently discontinued       #.  Acute blood loss anemia Due to surgical blood loss on top of chronic normocytic anemia   - Hemoglobin 10.6 on admission, was 14.2 in 6/2021   - Folate > 40, B12 364  - Iron studies showed iron deficiency with 9% iron saturation normal iron binding capacity  - Monitor hemoglobin  - Start on p.o. iron once no longer on narcotics to prevent additional constipation    #.  Essential hypertension  - Blood pressure currently in normal range  - continue amlodipine 10 mg daily.       #.  Chronic depression  #.  Generalized anxiety disorder  -Continue Lexapro, trazodone, lamotrigine     #.  Hypoalbuminemia, albumin 2     #.  Urinary retention-s/p Prieto placement  - Patient reports longstanding history of urinary retention especially after surgeries and when he is on narcotics.  He does not think he will be able to void if Prieto is removed.  - Continue Prieto catheter.    Discontinuing Prieto and voiding trial can be attempted at TCU  - Started on Flomax  - Increased risk of catheter associated infections discussed with patient.     COVID-19 negative 9/4/22    Consultations This Hospital Stay   ORTHOPEDIC SURGERY IP CONSULT  PHYSICAL THERAPY ADULT IP CONSULT  OCCUPATIONAL THERAPY ADULT IP CONSULT  PHYSICAL THERAPY ADULT IP CONSULT  OCCUPATIONAL THERAPY ADULT IP CONSULT  CARE MANAGEMENT / SOCIAL WORK IP CONSULT    Code Status   Prior    Time Spent on this Encounter   Kadie ARREOLA MD, personally saw the patient today and spent greater than 30 minutes discharging this patient.       Kadie Ospina MD  [facility]  [address] [address]  Phone: [phone]  Fax:  [phone]  ______________________________________________________________________    Physical Exam   Vital Signs: Temp: 97.4  F (36.3  C) Temp src: Oral BP: 107/63 Pulse: 94     SpO2: 96 % O2 Device: None (Room air)    Weight: 190 lbs 0 oz    GEN:  Alert, oriented x 3, comfortable, no overt respiratory distress.  CV:  Regular rate and rhythm, no loud murmur to ausc.  S1 + S2 noted.  LUNGS:  Clear to auscultation ant/lat bilaterally.  No clear rales/rhonchi/wheezing auscultated bilaterally.  No costal retractions bilaterally.  Symmetric chest rise on inhalation noted.  ABD:  Active bowel sounds, soft, non-tender, mildly distended.  No clear rebound/guarding/rigidity.  No masses palpated.  No obvious HSM to exam.  EXT:  No pretibial edema or cyanosis bilaterally. No joint synovitis noted.  No calf-tenderness or asymmetry noted.  SKIN:  Dry to touch, no rashes or jaundice noted.  NEURO:  No tremors at rest currently, speech is clear and appropriate.    urologic-Prieto catheter in place          Primary Care Physician   Brad Thurston    Discharge Orders      Primary Care - Care Coordination Referral      Reason for your hospital stay    ORIF left hip     When to call - Contact Surgeon Team    You may experience symptoms that require follow-up before your scheduled appointment. Refer to the \"Stoplight Tool\" for instructions on when to contact your Surgeon Team if you are concerned about pain control, blood clots, constipation, or if you are unable to urinate.     When to call - Reach out to Urgent Care    If you are not able to reach your Surgeon Team and you need immediate care, go to the Orthopedic Walk-in Clinic or Urgent Care at your Surgeon's office.  Do NOT go to the Emergency Room unless you have shortness of breath, chest pain, or other signs of a medical emergency.     When to call - Reasons to Call 911    Call 911 immediately if you experience sudden-onset chest pain, arm weakness/numbness, slurred speech, or " shortness of breath     Discharge Instruction - Breathing exercises    Perform breathing exercises using your Incentive Spirometer 10 times per hour while awake for 2 weeks.     Symptoms - Fever Management    A low grade fever can be expected after surgery.  Use acetaminophen (TYLENOL) as needed for fever management.  Contact your Surgeon Team if you have a fever greater than 101.5 F, chills, and/or night sweats.     Symptoms - Constipation management    Constipation (hard, dry bowel movements) is expected after surgery due to the combination of being less active, the anesthetic, and the opioid pain medication.  You can do the following to help reduce constipation:  ~  FLUIDS:  Drink clear liquids (water or Gatorade), or juice (apple/prune).  ~  DIET:  Eat a fiber rich diet.    ~  ACTIVITY:  Get up and move around several times a day.  Increase your activity as you are able.  MEDICATIONS:  Reduce the risk of constipation by starting medications before you are constipated.  You can take Miralax   (1 packet as directed) and/or a stool softener (Senokot 1-2 tablets 1-2 times a day).  If you already have constipation and these medications are not working, you can get magnesium citrate and use as directed.  If you continue to have constipation you can try an over the counter suppository or enema.  Call your Surgeon Team if it has been greater than 3 days since your last bowel movement.     Symptoms - Reduced Urine Output    Changes in the amount of fluids you drank before and after surgery may result in problems urinating.  It is important to stay well-hydrated after surgery and drink plenty of water. If it has been greater than 8 hours since you have urinated despite drinking plenty of water, call your Surgeon Team.     Activity - Exercises to prevent blood clots    Unless otherwise directed by your Surgeon team, perform the following exercises at least three times per day for the first four weeks after surgery to  prevent blood clots in your legs: 1) Point and flex your feet (Ankle Pumps), 2) Move your ankle around in big circles, 3) Wiggle your toes, 4) Walk, even for short distances, several times a day, will help decrease the risk of blood clots.     Comfort and Pain Management - Pain after Surgery    Pain after surgery is normal and expected.  You will have some amount of pain for several weeks after surgery.  Your pain will improve with time.  There are several things you can do to help reduce your pain including: rest, ice, elevation, and using pain medications as needed. Contact your Surgeon Team if you have pain that persists or worsens after surgery despite rest, ice, elevation, and taking your medication(s) as prescribed. Contact your Surgeon Team if you have new numbness, tingling, or weakness in your operative extremity.     Comfort and Pain Management - Swelling after Surgery    Swelling and/or bruising of the surgical extremity is common and may persist for several months after surgery. In addition to frequent icing and elevation, gentle compressive support with an ACE wrap or tubigrip may help with swelling. Apply compression regularly, removing at least twice daily to perform skin checks. Contact your Surgeon Team if your swelling increases and is NOT associated with an increase in your activity level, or if your swelling increases and is associated with redness and pain.     Comfort and Pain Management - Cold therapy    Ice can be used to control swelling and discomfort after surgery. Place a thin towel over your operative site and apply the ice pack overtop. Leave ice pack in place for 20 minutes, then remove for 20 minutes. Repeat this 20 minutes on/20 minutes off routine as often as tolerated.     Medication Instructions - Acetaminophen (TYLENOL) Instructions    You were discharged with acetaminophen (TYLENOL) for pain management after surgery. Acetaminophen most effectively manages pain symptoms when it  is taken on a schedule without missing doses (every four, six, or eight hours). Your Provider will prescribe a safe daily dose between 3000 - 4000 mg.  Do NOT exceed this daily dose. Most patients use acetaminophen for pain control for the first four weeks after surgery.  You can wean from this medication as your pain decreases.     Medication Instructions - NSAID Instructions    You were discharged with an anti-inflammatory medication for pain management to use in combination with acetaminophen (TYLENOL) and the narcotic pain medication.  Take this medication exactly as directed.  You should only take one anti-inflammatory at a time.  Some common anti-inflammatories include: ibuprofen (ADVIL, MOTRIN), naproxen (ALEVE, NAPROSYN), celecoxib (CELEBREX), meloxicam (MOBIC), ketorolac (TORADOL).  Take this medication with food and water.     Medication Instructions - Opioids - Tapering Instructions    In the first three days following surgery, your symptoms may warrant use of the narcotic pain medication every four to six hours as prescribed. This is normal. As your pain symptoms improve, focus your efforts on decreasing (tapering) use of narcotic medications. The most successful tapering strategy is to first, decrease the number of tablets you take every 4-6 hours to the minimum prescribed. Then, increase the amount of time between doses.  For example:  First, taper to   or 1 tablet every 4-6 hours.  Then, taper to   or 1 tablet every 6-8 hours.  Then, taper to   or 1 tablet every 8-10 hours.  Then, taper to   or 1 tablet every 10-12 hours.  Then, taper to   or 1 tablet at bedtime.  The bedtime dose can help with comfort during sleep and is typically the last dose to be discontinued after surgery.     Follow Up Care    Please follow-up with Dr Cochran and Ayde GOMEZ, [facility], in clinic for a 2 week post-op appointment.  At this visit, your post-op dressing will be removed and  "any staples or sutures taken out.  Please call Dr Cochran's patient coordinator, Jerri, to schedule this appointment and also with any questions or concerns.  She can be reached during normal business hours (M-F, 8am-5pm) at [phone].   If you have any questions or concerns after-hours, please contact the on call surgeon at [phone].                         If you are unable to make the 2 week post-op appointment because you are at a care facility, the rehab nursing team can assist with removal of your post-op dressing and staples (see below) however, please make sure you still contact Dr Cochran's patient coordinator to set up a 6 week post-op appointment.  At the 6-week post-op visit, Dr Cochran's team will assess your incision and also take x-rays.     Medication instructions -  Anticoagulation - aspirin    Take the aspirin as prescribed for a total of four weeks after surgery.  This is given to help minimize your risk of blood clot.     Medication Instructions - Opioid Instructions (1 - 2 tablets Q 4-6 hours, MAX 6 tablets)    You were discharged with an opioid medication (hydromorphone, oxycodone, hydrocodone, or tramadol). This medication should only be taken for breakthrough pain that is not controlled with acetaminophen (TYLENOL). If you rate your pain less than 3 you do not need this medication.  Pain rating 0-3:  You do not need this medication.  Pain rating 4-6:  Take 1 tablet every 4-6 hours as needed  Pain rating 7-10:  Take 2 tablets every 4-6 hours as needed.  Do not exceed 6 tablets per day     Activity - Total Hip Arthroplasty    Refer to the [facility] \"Your Guide to Total Joint Replacement\" for recommendations on activities and Exercises.     Return to Driving    Return to driving - Driving is NOT permitted until directed by your provider. Under no circumstance are you permitted to drive while using narcotic pain medications.     Dressing / Wound Care - Wound    You have a clean " "dressing on your surgical wound. Dressing change instructions as follows: dressing will be removed at your follow-up appointment. Contact your Surgeon Team if you have increased redness, warmth around the surgical wound, and/or drainage from the surgical wound.     Dressing / Wound Care - NO Tub Bathing    Tub bathing, swimming, or any other activities that will cause your incision to be submerged in water should be avoided until allowed by your Surgeon.     No precautions    No precautions directed by your Provider.  You may perform range of motion activities as tolerated.     Weight bearing as tolerated    Weight bearing as tolerated on your operative extremity.     Dressing Wound Care - Shower with wound/dressing NOT covered    You do not need to cover your dressing or incision in the shower, you may allow water and soap to run over top of the surgical dressing or incision. You may shower 2 days after surgery.  You are strictly prohibited from soaking or submerging the surgical wound underwater.     Comfort and Pain Management - LOWER Extremity Elevation    Swelling is expected for several months after surgery. This type of swelling is usually associated with gravity and activity, and can be improved with elevation.   The best way to do this is to get your \"toes above your nose\" by laying down and placing several pillows lengthwise under your calf and heel. When elevating your leg keep your knee completely straight. Perform this elevation as often as possible especially for the first two weeks after surgery.     General info for SNF    Length of Stay Estimate: Short Term Care: Estimated # of Days <30  Condition at Discharge: Stable  Level of care:skilled   Rehabilitation Potential: Good  Admission H&P remains valid and up-to-date: Yes  Recent Chemotherapy: N/A  Use Nursing Home Standing Orders: Yes     General info for SNF    Length of Stay Estimate: Short Term Care: Estimated # of Days <30  Condition at " Discharge: Stable  Level of care:skilled   Rehabilitation Potential: Good  Admission H&P remains valid and up-to-date: Yes  Recent Chemotherapy: N/A  Use Nursing Home Standing Orders: Yes     Mantoux instructions    Give two-step Mantoux (PPD) Per Facility Policy Yes     Follow Up and recommended labs and tests    Follow up with Nursing home physician.  No follow up labs or test are needed.  Voiding trial in 1 to 2 weeks     Reason for your hospital stay    Left hip fracture requiring surgery     Prieto catheter    To straight gravity drainage. Change catheter every 2 weeks and PRN for leaking or decreased uring output with signs of bladder distention. DO NOT change catheter without a specific MD order IF diagnosis of benign prostatic hypertrophy (BPH), neurogenic bladder, or other urological conditions     Activity - Up with assistive device     Activity - Up with nursing assistance     Physical Therapy Adult Consult    Evaluate and treat as clinically indicated.    Reason:  s/p ORIF hip     Occupational Therapy Adult Consult    Evaluate and treat as clinically indicated.    Reason:  s/p ORIF hip     Fall precautions     Crutches DME    DME Documentation: Describe the reason for need to support medical necessity: Impaired gait status post hip surgery. I, the undersigned, certify that the above prescribed supplies are medically necessary for this patient and is both reasonable and necessary in reference to accepted standards of medical practice in the treatment of this patient's condition and is not prescribed as a convenience.     Cane DME    DME Documentation: Describe the reason for need to support medical necessity: Impaired gait status post hip surgery. I, the undersigned, certify that the above prescribed supplies are medically necessary for this patient and is both reasonable and necessary in reference to accepted standards of medical practice in the treatment of this patient's condition and is not prescribed  as a convenience.     Walker DME    : DME Documentation: Describe the reason for need to support medical necessity: Impaired gait status post hip surgery. I, the undersigned, certify that the above prescribed supplies are medically necessary for this patient and is both reasonable and necessary in reference to accepted standards of medical practice in the treatment of this patient's condition and is not prescribed as a convenience.     Discharge Instruction - Regular Diet Adult    Return to your pre-surgery diet unless instructed otherwise     Diet    Follow this diet upon discharge: Regular       Significant Results and Procedures   Results for orders placed or performed during the hospital encounter of 09/03/22   XR Pelvis w Hip Left 1 View    Narrative    EXAM: PELVIS AND LEFT HIP 2 VIEWS  LOCATION: [facility]  DATE/TIME: 9/3/2022 10:57 PM    INDICATION: Fall. Left hip pain.  COMPARISON: None.      Impression    IMPRESSION:   1. Acute comminuted transverse fracture of the proximal left femur, extending from the region of the lesser trochanter medially and into the subtrochanteric region of the proximal femur laterally. There is moderate to marked varus angulation about the   fracture.  2. No other visualized acute fractures of the pelvis.  3. Diffuse osteopenia.    XR Surgery EMMA L/T 5 Min Fluoro w Stills    Narrative    EXAM: XR SURGERY EMMA FLUORO LESS THAN 5 MIN W STILLS  LOCATION: [facility]  DATE/TIME: 9/4/2022 1:11 PM    INDICATION: Intraoperative assessment  COMPARISON: 09/03/2022  TECHNIQUE: Exam performed by surgeon.    FINDINGS:    FLUOROSCOPIC TIME: 1.5  NUMBER OF IMAGES: 6      Impression    IMPRESSION:  1.  Intramedullary nail with dynamic proximal screw and locking distal screw fixation have been placed across the intertrochanteric fracture.       Discharge Medications   Discharge Medication List as of 9/6/2022  6:36 PM      START taking these  medications    Details   acetaminophen (TYLENOL) 325 MG tablet Take 2 tablets (650 mg) by mouth every 4 hours as needed for other (mild pain), Disp-100 tablet, R-0, E-Prescribe      aspirin (ASA) 325 MG EC tablet Take 1 tablet (325 mg) by mouth daily, Disp-30 tablet, R-0, E-Prescribe      polyethylene glycol (MIRALAX) 17 GM/Dose powder Take 17 g by mouth daily, Disp-510 g, R-0, Transitional      senna-docusate (SENOKOT-S/PERICOLACE) 8.6-50 MG tablet Take 1-2 tablets by mouth 2 times daily Take while on oral narcotics to prevent or treat constipation., Disp-30 tablet, R-0, E-PrescribeWhile taking narcotics      tamsulosin (FLOMAX) 0.4 MG capsule Take 1 capsule (0.4 mg) by mouth every evening, Disp-30 capsule, R-0, Transitional         CONTINUE these medications which have CHANGED    Details   oxyCODONE (ROXICODONE) 5 MG tablet Take 1-2 tablets (5-10 mg) by mouth every 4 hours as needed, Disp-25 tablet, R-0, Local PrintTake 1 tablet for pain rated 1-7 and 2 tablets for pain rated 8-10      traZODone (DESYREL) 50 MG tablet Take 2 tablets (100 mg) by mouth At Bedtime, Transitional         CONTINUE these medications which have NOT CHANGED    Details   amLODIPine (NORVASC) 10 MG tablet Take 1 tablet (10 mg) by mouth daily, Disp-90 tablet, R-0, E-PrescribeMedication is being filled for 1 time refill only due to:  Patient needs to be seen because it has been more than one year since last visit. Keep scheduled appt.      escitalopram (LEXAPRO) 20 MG tablet Take 20 mg by mouth daily, Historical      fluticasone (FLONASE) 50 MCG/ACT nasal spray use 2 sprays in each nostril daily., Disp-48 g, R-3, E-Prescribe      lamoTRIgine (LAMICTAL) 200 MG tablet Take 200 mg by mouth At Bedtime, HistoricalPrescribed by psychiatry      Multiple Vitamins-Minerals (MULTIVITAMIN ADULT PO) Take 1 tablet by mouth daily, Historical      polyethylene glycol-propylene glycol (SYSTANE ULTRA) 0.4-0.3 % SOLN ophthalmic solution Place 1 drop into both  eyes 4 times daily as needed, Historical      vitamin C (ASCORBIC ACID) 500 MG tablet Take 500 mg by mouth daily, Historical      zinc 50 MG TABS Take 100 mg by mouth daily, Historical           Allergies   Allergies   Allergen Reactions     Lisinopril Other (See Comments)     Elevated potassium     Sertraline Diarrhea      Constitutional: (-) fever  (-)chills  (-)sweats  Eyes/ENT: (-)   Cardiovascular: (-) chest pain, (-) palpitations (-) edema   Respiratory: (-) cough, (-) shortness of breath   Gastrointestinal: (-)nausea  (-)vomiting, (-) diarrhea  (-) abdominal pain   :  (-)dysuria, (-)frequency, (-)urgency, (-)hematuria  Musculoskeletal: (-) neck pain, (-) back pain, (-) joint pain  Integumentary: (-) rash, (-) edema  Neurological: (-) headache, (-) altered mental status  (-)LOC +fall

## 2025-05-10 NOTE — ED ADULT NURSE NOTE - NS_SISCREENINGSR_GEN_ALL_ED
Call to patient. States she has had this pain before but this time it is going to her back more. Pain is on the mid right side and then goes around to the back. Pain has been present for 1 month. Pain occurs with rest and sleep and is better with activity. Patient is requesting to see Dr. Sultana for next available appointment. States she will call back if it gets worse. States pain is not as bad as it was last week. Pain is mild currently.Scheduled.     Reason for Disposition    Patient wants to be seen    Additional Information    Negative: Passed out (i.e., lost consciousness, collapsed and was not responding)    Negative: Shock suspected (e.g., cold/pale/clammy skin, too weak to stand, low BP, rapid pulse)    Negative: Sounds like a life-threatening emergency to the triager    Negative: Followed a major injury to the back (e.g., MVA, fall > 10 feet or 3 meters, penetrating injury, etc.)    Negative: Upper, mid or lower back pain that occurs mainly in the midline    Negative: SEVERE pain (e.g., excruciating, scale 8-10) and present > 1 hour    Negative: Sudden onset of severe flank pain and age > 60    Negative: Abdominal pain and age > 60    Negative: Unable to urinate (or only a few drops) > 4 hours and bladder feels very full (e.g., palpable bladder or strong urge to urinate)    Negative: Pain radiates into groin, scrotum    Negative: Blood in urine (red, pink, or tea-colored)    Negative: Vomiting    Negative: Weakness of a leg or foot (e.g., unable to bear weight, dragging foot)    Negative: Patient sounds very sick or weak to the triager    Negative: Fever > 100.4 F (38.0 C)    Negative: Pain or burning with passing urine (urination)    Negative: MODERATE pain (e.g., interferes with normal activities or awakens from sleep)    Negative: Painful rash with multiple small blisters grouped together (i.e., dermatomal distribution or 'band' or 'stripe')    Negative: Pregnant (Exception: mild pain that is only  present with movement)    Negative: Diabetes mellitus or weak immune system (e.g., HIV positive, cancer chemo, splenectomy, organ transplant, chronic steroids) (Exception: mild pain that is only present with movement)    Protocols used: FLANK PAIN-A-OH       Negative

## 2025-05-10 NOTE — ED ADULT NURSE NOTE - SUICIDE SCREENING QUESTION 3
Pharmacy called. They need script for meter, lancets and strips resent with diagnosis code included on scripts.     Medicine Refill Request    Last Office Visit: 7/30/2019  Last Telemedicine Visit: 5/4/2020 Moy العلي MD    Next Office Visit: Visit date not found  Next Telemedicine Visit: Visit date not found         Current Outpatient Medications:   •  blood sugar diagnostic strip, 1 strip 3 (three) times a day before meals., Disp: 100 strip, Rfl: 3  •  blood-glucose meter (ONETOUCH ULTRAMINI) kit, Test daily before all meals/snacks and once before bedtime., Disp: 1 each, Rfl: 0  •  finasteride (PROSCAR) 5 mg tablet, Take 5 mg by mouth daily., Disp: , Rfl:   •  fluticasone (FLONASE) 50 mcg/actuation nasal spray, Administer 1 spray into each nostril daily., Disp: , Rfl:   •  ibuprofen (MOTRIN) 800 mg tablet, Take 1 tablet (800 mg total) by mouth every 6 (six) hours as needed for mild pain for up to 10 days., Disp: 30 tablet, Rfl: 0  •  lancets (onetouch ultrasoft) misc, Test daily before all meals/snacks and once before bedtime., Disp: 3 each, Rfl: 0  •  lutein 20 mg tablet, Take by mouth., Disp: , Rfl:   •  tamsulosin (FLOMAX) 0.4 mg capsule, Take by mouth once daily., Disp: , Rfl: 11      BP Readings from Last 3 Encounters:   09/15/19 (!) 156/88   07/30/19 118/80   02/07/19 140/80       Recent Lab results:  Lab Results   Component Value Date    CHOL 188 01/29/2019   ,   Lab Results   Component Value Date    HDL 39 (L) 01/29/2019   ,   Lab Results   Component Value Date    LDLCALC 123 (H) 01/29/2019   ,   Lab Results   Component Value Date    TRIG 132 01/29/2019        Lab Results   Component Value Date    GLUCOSE 114 (H) 07/30/2019   ,   Lab Results   Component Value Date    HGBA1C 6.7 (H) 07/30/2019         Lab Results   Component Value Date    CREATININE 1.2 07/30/2019       No results found for: TSH        
No

## 2025-05-10 NOTE — ED ADULT NURSE NOTE - OBJECTIVE STATEMENT
Pt c/o recurrent falls over the past 5 years. States she feels like her legs give out which leads to her falling, she becomes shaky, n/v and then it passes. Last episode tuesday. Denies head strike, loc, thinners. Endorses drinking daily 2-3 drinks per day. Last drink monday. States her pcp advised her to get evaluated in ED for her liver. Denies drug use.

## 2025-05-10 NOTE — ED PROVIDER NOTE - PHYSICAL EXAMINATION
General:     NAD  Head:     NC/AT, EOMI, oral mucosa moist  Neck:     trachea midline  Lungs:     CTA b/l, no w/r/r  CVS:     S1S2, RRR, no m/g/r  Abd:     +BS, s/nt/nd, no organomegaly  Ext:    2+ radial and pedal pulses, no c/c/e  Neuro: AAOx3, no sensory/motor deficits, no asterixis

## 2025-05-10 NOTE — ED ADULT NURSE NOTE - NSFALLRISKINTERV_ED_ALL_ED

## 2025-05-10 NOTE — ED PROVIDER NOTE - OBJECTIVE STATEMENT
64-year-old female; with PMH significant for alcohol use disorder, hepatic steatosis, prior history of hematemesis; now presenting with near syncopal episode.  Reports drinking 3 to 4 glasses of wine daily.  Patient states she stopped drinking on Monday.  Patient states she started to feel tremulous on Tuesday.  Reports feeling so tremulous that she fell while walking to work.  Denies head trauma.  Denies LOC.  Patient reports some nausea.  Denies vomiting.  Reports dizziness.  Patient states symptoms lasted 2 to 3 days.  Patient now states she feels well.  Patient states she had has detox from alcohol in the past..  Denies any history of seizures in the past.

## 2025-05-10 NOTE — CHART NOTE - NSCHARTNOTEFT_GEN_A_CORE
MIKAELA Note: MIKAELA made aware by ED Provider that pt is requesting to speak with SW in regards to substance use resources. SW met with pt and pt's son at bedside. Permission was given pt to all speak together. SW provided pt and pt's son with education on inpatient and outpatient substance use process. Pt reports she would like to go home and review substance use resources. As per pt, she does not want to be here in the hospital any longer. SW provided pt with substance use resource list, SBIRT flyer, and [facility] pamphlet. Pt requesting to be discharged. MIKAELA informed ED provider. No further SW needs noted. SW signing off.

## 2025-05-10 NOTE — ED ADULT NURSE REASSESSMENT NOTE - NS ED NURSE REASSESS COMMENT FT1
pt request IV removal. pt was made aware of potential for need to have IV placed in event of new med/lab orders. pt agrees,

## 2025-05-10 NOTE — ED ADULT TRIAGE NOTE - CHIEF COMPLAINT QUOTE
c/o falling many times on Tuesday causing unsteadiness and feeling shaky and thinks she has liver problems from heavy drinking, last drink was on Monday, also c/o nauseous.

## 2025-05-10 NOTE — ED PROVIDER NOTE - PATIENT PORTAL LINK FT
You can access the FollowMyHealth Patient Portal offered by [facility] by registering at the following website: [email or web address]. By joining [facility]’s FollowMyHealth portal, you will also be able to view your health information using other applications (apps) compatible with our system.

## 2025-05-10 NOTE — ED PROVIDER NOTE - CLINICAL SUMMARY MEDICAL DECISION MAKING FREE TEXT BOX
64-year-old female; with PMH significant for alcohol use disorder, hepatic steatosis, prior history of hematemesis; now presenting with near syncopal episode.  Reports drinking 3 to 4 glasses of wine daily.  Patient states she stopped drinking on Monday.  Patient states she started to feel tremulous on Tuesday.  Reports feeling so tremulous that she fell while walking to work.  Denies head trauma.  Denies LOC.  Patient reports some nausea.  Denies vomiting.  Reports dizziness.  Patient states symptoms lasted 2 to 3 days.  Patient now states she feels well.  Patient states she had has detox from alcohol in the past..  Denies any history of seizures in the past. labs show only minimally elevated lfts. sbirt consulted. 64-year-old female; with PMH significant for alcohol use disorder, hepatic steatosis, prior history of hematemesis; now presenting with near syncopal episode.  Reports drinking 3 to 4 glasses of wine daily.  Patient states she stopped drinking on Monday.  Patient states she started to feel tremulous on Tuesday.  Reports feeling so tremulous that she fell while walking to work.  Denies head trauma.  Denies LOC.  Patient reports some nausea.  Denies vomiting.  Reports dizziness.  Patient states symptoms lasted 2 to 3 days.  Patient now states she feels well.  Patient states she had has detox from alcohol in the past..  Denies any history of seizures in the past. labs show only minimally elevated lfts. sbirt consulted.    labs with minimally elevated LFTs and mildly elevated lipase. tolerating po. ciwa 0.  offered inpatient rehab, but refusing at this time.  will provide resources.

## 2025-05-15 LAB
AFP-TM SERPL-MCNC: 15.1 NG/ML
ALBUMIN SERPL ELPH-MCNC: 5.2 G/DL
ALP BLD-CCNC: 84 U/L
ALT SERPL-CCNC: 50 U/L
ANION GAP SERPL CALC-SCNC: 21 MMOL/L
AST SERPL-CCNC: 106 U/L
BASOPHILS # BLD AUTO: 0.04 K/UL
BASOPHILS NFR BLD AUTO: 0.9 %
BILIRUB SERPL-MCNC: 1.4 MG/DL
BUN SERPL-MCNC: 10 MG/DL
CALCIUM SERPL-MCNC: 9.8 MG/DL
CHLORIDE SERPL-SCNC: 92 MMOL/L
CO2 SERPL-SCNC: 22 MMOL/L
CREAT SERPL-MCNC: 0.83 MG/DL
EGFRCR SERPLBLD CKD-EPI 2021: 78 ML/MIN/1.73M2
EOSINOPHIL # BLD AUTO: 0.02 K/UL
EOSINOPHIL NFR BLD AUTO: 0.5 %
GLUCOSE SERPL-MCNC: 86 MG/DL
HCT VFR BLD CALC: 43.7 %
HCV AB SER QL: NONREACTIVE
HCV S/CO RATIO: 0.15 S/CO
HGB BLD-MCNC: 15.2 G/DL
IMM GRANULOCYTES NFR BLD AUTO: 0.2 %
LYMPHOCYTES # BLD AUTO: 0.68 K/UL
LYMPHOCYTES NFR BLD AUTO: 16 %
MAN DIFF?: NORMAL
MCHC RBC-ENTMCNC: 34.8 G/DL
MCHC RBC-ENTMCNC: 35.9 PG
MCV RBC AUTO: 103.3 FL
MONOCYTES # BLD AUTO: 0.56 K/UL
MONOCYTES NFR BLD AUTO: 13.2 %
NEUTROPHILS # BLD AUTO: 2.93 K/UL
NEUTROPHILS NFR BLD AUTO: 69.2 %
PLATELET # BLD AUTO: 159 K/UL
POTASSIUM SERPL-SCNC: 4 MMOL/L
PROT SERPL-MCNC: 8.1 G/DL
RBC # BLD: 4.23 M/UL
RBC # FLD: 13 %
SODIUM SERPL-SCNC: 135 MMOL/L
WBC # FLD AUTO: 4.24 K/UL
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
## 2025-05-16 DIAGNOSIS — Z88.0 ALLERGY STATUS TO PENICILLIN: ICD-10-CM

## 2025-05-16 DIAGNOSIS — R55 SYNCOPE AND COLLAPSE: ICD-10-CM

## 2025-05-16 DIAGNOSIS — K76.0 FATTY (CHANGE OF) LIVER, NOT ELSEWHERE CLASSIFIED: ICD-10-CM

## 2025-05-16 DIAGNOSIS — F10.939 ALCOHOL USE, UNSPECIFIED WITH WITHDRAWAL, UNSPECIFIED: ICD-10-CM

## 2025-06-10 ENCOUNTER — APPOINTMENT (OUTPATIENT)
Dept: GASTROENTEROLOGY | Facility: CLINIC | Age: 65
End: 2025-06-10
Payer: COMMERCIAL

## 2025-06-10 VITALS
HEART RATE: 76 BPM | BODY MASS INDEX: 20.49 KG/M2 | SYSTOLIC BLOOD PRESSURE: 111 MMHG | HEIGHT: 64 IN | DIASTOLIC BLOOD PRESSURE: 80 MMHG | OXYGEN SATURATION: 98 % | WEIGHT: 120 LBS

## 2025-06-10 PROBLEM — F10.90 ALCOHOL USE DISORDER: Status: ACTIVE | Noted: 2025-06-10

## 2025-06-10 PROCEDURE — 99205 OFFICE O/P NEW HI 60 MIN: CPT

## 2025-06-10 RX ORDER — MULTIVITAMIN
TABLET ORAL
Refills: 0 | Status: ACTIVE | COMMUNITY

## 2025-06-10 RX ORDER — ACAMPROSATE CALCIUM 333 MG/1
333 TABLET, DELAYED RELEASE ORAL 3 TIMES DAILY
Qty: 90 | Refills: 0 | Status: ACTIVE | COMMUNITY
Start: 2025-06-10 | End: 1900-01-01

## 2025-06-11 PROBLEM — A04.72 CLOSTRIDIOIDES DIFFICILE DIARRHEA: Status: ACTIVE | Noted: 2025-06-11

## 2025-06-11 RX ORDER — FIDAXOMICIN 200 MG/1
200 TABLET, FILM COATED ORAL TWICE DAILY
Qty: 20 | Refills: 0 | Status: ACTIVE | COMMUNITY
Start: 2025-06-11 | End: 1900-01-01

## 2025-06-20 ENCOUNTER — OUTPATIENT (OUTPATIENT)
Dept: OUTPATIENT SERVICES | Facility: HOSPITAL | Age: 65
LOS: 1 days | End: 2025-06-20
Payer: COMMERCIAL

## 2025-06-20 ENCOUNTER — APPOINTMENT (OUTPATIENT)
Dept: ULTRASOUND IMAGING | Facility: CLINIC | Age: 65
End: 2025-06-20
Payer: COMMERCIAL

## 2025-06-20 DIAGNOSIS — R79.89 OTHER SPECIFIED ABNORMAL FINDINGS OF BLOOD CHEMISTRY: ICD-10-CM

## 2025-06-20 PROCEDURE — 93975 VASCULAR STUDY: CPT | Mod: 26

## 2025-06-20 PROCEDURE — 93975 VASCULAR STUDY: CPT

## 2025-06-24 ENCOUNTER — NON-APPOINTMENT (OUTPATIENT)
Age: 65
End: 2025-06-24

## 2025-06-24 RX ORDER — VANCOMYCIN HYDROCHLORIDE 125 MG/1
125 CAPSULE ORAL
Qty: 40 | Refills: 0 | Status: ACTIVE | COMMUNITY
Start: 2025-06-24 | End: 1900-01-01

## 2025-06-27 ENCOUNTER — OFFICE (OUTPATIENT)
Dept: URBAN - METROPOLITAN AREA CLINIC 115 | Facility: CLINIC | Age: 65
Setting detail: OPHTHALMOLOGY
End: 2025-06-27
Payer: COMMERCIAL

## 2025-06-27 DIAGNOSIS — H01.005: ICD-10-CM

## 2025-06-27 DIAGNOSIS — H04.122: ICD-10-CM

## 2025-06-27 DIAGNOSIS — H04.123: ICD-10-CM

## 2025-06-27 DIAGNOSIS — Z96.1: ICD-10-CM

## 2025-06-27 DIAGNOSIS — H01.002: ICD-10-CM

## 2025-06-27 DIAGNOSIS — H04.121: ICD-10-CM

## 2025-06-27 PROCEDURE — 92014 COMPRE OPH EXAM EST PT 1/>: CPT | Performed by: OPHTHALMOLOGY

## 2025-06-27 PROCEDURE — 83861 MICROFLUID ANALY TEARS: CPT | Mod: QW,RT | Performed by: OPHTHALMOLOGY

## 2025-06-27 PROCEDURE — 83861 MICROFLUID ANALY TEARS: CPT | Mod: QW,LT | Performed by: OPHTHALMOLOGY

## 2025-06-27 ASSESSMENT — REFRACTION_CURRENTRX
OS_CYLINDER: -0.50
OD_VPRISM_DIRECTION: SV
OD_AXIS: 086
OS_SPHERE: -1.25
OD_SPHERE: -1.00
OD_OVR_VA: 20/
OS_OVR_VA: 20/
OD_CYLINDER: -1.00
OS_AXIS: 081
OS_VPRISM_DIRECTION: SV

## 2025-06-27 ASSESSMENT — CONFRONTATIONAL VISUAL FIELD TEST (CVF)
OS_FINDINGS: FULL
OD_FINDINGS: FULL

## 2025-06-27 ASSESSMENT — SUPERFICIAL PUNCTATE KERATITIS (SPK)
OD_SPK: 1+
OS_SPK: 1+

## 2025-06-27 ASSESSMENT — REFRACTION_AUTOREFRACTION
OS_CYLINDER: -1.00
OS_SPHERE: +1.00
OS_AXIS: 084
OD_CYLINDER: -1.50
OD_SPHERE: +0.75
OD_AXIS: 077

## 2025-06-27 ASSESSMENT — LID EXAM ASSESSMENTS
OD_BLEPHARITIS: RLL T
OS_BLEPHARITIS: LLL T

## 2025-06-27 ASSESSMENT — REFRACTION_MANIFEST
OS_ADD: +2.50
OD_ADD: +2.50
OD_SPHERE: PLAMO
OS_SPHERE: PLANO

## 2025-06-27 ASSESSMENT — PUNCTA - ASSESSMENT
OD_PUNCTA: SMALL
OS_PUNCTA: SMALL

## 2025-06-27 ASSESSMENT — TONOMETRY
OD_IOP_MMHG: 16
OS_IOP_MMHG: 16

## 2025-06-27 ASSESSMENT — VISUAL ACUITY
OD_BCVA: 20/25
OS_BCVA: 20/30

## 2025-07-02 ENCOUNTER — APPOINTMENT (OUTPATIENT)
Dept: DERMATOLOGY | Facility: CLINIC | Age: 65
End: 2025-07-02
Payer: COMMERCIAL

## 2025-07-02 PROCEDURE — 99202 OFFICE O/P NEW SF 15 MIN: CPT

## 2025-07-22 ENCOUNTER — APPOINTMENT (OUTPATIENT)
Dept: FAMILY MEDICINE | Facility: CLINIC | Age: 65
End: 2025-07-22

## 2025-07-28 ENCOUNTER — APPOINTMENT (OUTPATIENT)
Dept: NEUROLOGY | Facility: CLINIC | Age: 65
End: 2025-07-28

## 2025-09-10 ENCOUNTER — RESULT REVIEW (OUTPATIENT)
Age: 65
End: 2025-09-10

## 2025-09-17 ENCOUNTER — TRANSCRIPTION ENCOUNTER (OUTPATIENT)
Age: 65
End: 2025-09-17

## 2025-09-18 ENCOUNTER — NON-APPOINTMENT (OUTPATIENT)
Age: 65
End: 2025-09-18

## (undated) DEVICE — DRSG 2X2

## (undated) DEVICE — FORCEP RADIAL JAW 4 W NDL 2.4MM 2.8MM 240CM ORANGE DISP

## (undated) DEVICE — SOL IRR BAG NS 0.9% 1000ML

## (undated) DEVICE — MASK PROC EAR LOOP

## (undated) DEVICE — SYR SLIP 10CC

## (undated) DEVICE — GOWN IMPERV XL

## (undated) DEVICE — VENODYNE/SCD SLEEVE CALF MEDIUM

## (undated) DEVICE — DENTURE CUP PINK

## (undated) DEVICE — CATH IV SAFE BC 22G X 1" (BLUE)

## (undated) DEVICE — TUBING IV EXTENSION MACRO W CLAVE 7"

## (undated) DEVICE — PACK IV START WITH CHG

## (undated) DEVICE — SYR IV FLUSH SALINE 10ML 30/TY

## (undated) DEVICE — SOL BAG NS 0.9% 1000ML

## (undated) DEVICE — SOL IRR BAG H2O 1000ML

## (undated) DEVICE — SYR LUER SLIP TIP 50CC

## (undated) DEVICE — DRSG CURITY GAUZE SPONGE 4 X 4" 12-PLY NON-STERILE

## (undated) DEVICE — BITE BLOCK ADULT 20 X 27MM (GREEN)

## (undated) DEVICE — UNDERPAD LINEN SAVER 23 X 36"

## (undated) DEVICE — SENSOR O2 FINGER ADULT

## (undated) DEVICE — TUBING ALARIS PUMP MODULE NON-DEHP

## (undated) DEVICE — WARMING BLANKET FULL ADULT